# Patient Record
Sex: FEMALE | Race: WHITE | NOT HISPANIC OR LATINO | Employment: UNEMPLOYED | URBAN - NONMETROPOLITAN AREA
[De-identification: names, ages, dates, MRNs, and addresses within clinical notes are randomized per-mention and may not be internally consistent; named-entity substitution may affect disease eponyms.]

---

## 2021-09-30 PROCEDURE — 88305 TISSUE EXAM BY PATHOLOGIST: CPT | Performed by: PLASTIC SURGERY

## 2021-10-04 ENCOUNTER — LAB REQUISITION (OUTPATIENT)
Dept: LAB | Facility: HOSPITAL | Age: 61
End: 2021-10-04

## 2021-10-04 DIAGNOSIS — Z00.00 ENCOUNTER FOR GENERAL ADULT MEDICAL EXAMINATION WITHOUT ABNORMAL FINDINGS: ICD-10-CM

## 2021-10-05 LAB
CYTO UR: NORMAL
LAB AP CASE REPORT: NORMAL
LAB AP CLINICAL INFORMATION: NORMAL
PATH REPORT.FINAL DX SPEC: NORMAL
PATH REPORT.GROSS SPEC: NORMAL

## 2022-02-11 ENCOUNTER — APPOINTMENT (OUTPATIENT)
Dept: GENERAL RADIOLOGY | Facility: HOSPITAL | Age: 62
End: 2022-02-11

## 2022-02-11 ENCOUNTER — HOSPITAL ENCOUNTER (OUTPATIENT)
Facility: HOSPITAL | Age: 62
Discharge: HOME | End: 2022-02-12
Attending: EMERGENCY MEDICINE | Admitting: EMERGENCY MEDICINE

## 2022-02-11 DIAGNOSIS — R07.2 PRECORDIAL PAIN: Primary | ICD-10-CM

## 2022-02-11 DIAGNOSIS — R94.31 ST SEGMENT DEPRESSION: ICD-10-CM

## 2022-02-11 PROBLEM — R07.9 CHEST PAIN: Status: ACTIVE | Noted: 2022-02-11

## 2022-02-11 LAB
ALBUMIN SERPL-MCNC: 3.8 G/DL (ref 3.5–5.2)
ALBUMIN/GLOB SERPL: 1.1 G/DL
ALP SERPL-CCNC: 95 U/L (ref 39–117)
ALT SERPL W P-5'-P-CCNC: 10 U/L (ref 1–33)
ANION GAP SERPL CALCULATED.3IONS-SCNC: 8 MMOL/L (ref 5–15)
AST SERPL-CCNC: 14 U/L (ref 1–32)
BASOPHILS # BLD AUTO: 0.08 10*3/MM3 (ref 0–0.2)
BASOPHILS NFR BLD AUTO: 0.9 % (ref 0–1.5)
BILIRUB SERPL-MCNC: 0.4 MG/DL (ref 0–1.2)
BUN SERPL-MCNC: 23 MG/DL (ref 8–23)
BUN/CREAT SERPL: 36.5 (ref 7–25)
CALCIUM SPEC-SCNC: 9.6 MG/DL (ref 8.6–10.5)
CHLORIDE SERPL-SCNC: 103 MMOL/L (ref 98–107)
CO2 SERPL-SCNC: 31 MMOL/L (ref 22–29)
CREAT SERPL-MCNC: 0.63 MG/DL (ref 0.57–1)
D DIMER PPP FEU-MCNC: 0.37 MG/L (FEU) (ref 0–0.5)
DEPRECATED RDW RBC AUTO: 47.7 FL (ref 37–54)
EOSINOPHIL # BLD AUTO: 0.31 10*3/MM3 (ref 0–0.4)
EOSINOPHIL NFR BLD AUTO: 3.6 % (ref 0.3–6.2)
ERYTHROCYTE [DISTWIDTH] IN BLOOD BY AUTOMATED COUNT: 13.8 % (ref 12.3–15.4)
GFR SERPL CREATININE-BSD FRML MDRD: 96 ML/MIN/1.73
GLOBULIN UR ELPH-MCNC: 3.4 GM/DL
GLUCOSE SERPL-MCNC: 168 MG/DL (ref 65–99)
HCT VFR BLD AUTO: 43.6 % (ref 34–46.6)
HGB BLD-MCNC: 14.1 G/DL (ref 12–15.9)
IMM GRANULOCYTES # BLD AUTO: 0.02 10*3/MM3 (ref 0–0.05)
IMM GRANULOCYTES NFR BLD AUTO: 0.2 % (ref 0–0.5)
LYMPHOCYTES # BLD AUTO: 2.17 10*3/MM3 (ref 0.7–3.1)
LYMPHOCYTES NFR BLD AUTO: 25.2 % (ref 19.6–45.3)
MCH RBC QN AUTO: 30.2 PG (ref 26.6–33)
MCHC RBC AUTO-ENTMCNC: 32.3 G/DL (ref 31.5–35.7)
MCV RBC AUTO: 93.4 FL (ref 79–97)
MONOCYTES # BLD AUTO: 0.56 10*3/MM3 (ref 0.1–0.9)
MONOCYTES NFR BLD AUTO: 6.5 % (ref 5–12)
NEUTROPHILS NFR BLD AUTO: 5.48 10*3/MM3 (ref 1.7–7)
NEUTROPHILS NFR BLD AUTO: 63.6 % (ref 42.7–76)
NRBC BLD AUTO-RTO: 0 /100 WBC (ref 0–0.2)
NT-PROBNP SERPL-MCNC: 2483 PG/ML (ref 0–900)
PLATELET # BLD AUTO: 206 10*3/MM3 (ref 140–450)
PMV BLD AUTO: 11.6 FL (ref 6–12)
POTASSIUM SERPL-SCNC: 3.8 MMOL/L (ref 3.5–5.2)
PROT SERPL-MCNC: 7.2 G/DL (ref 6–8.5)
RBC # BLD AUTO: 4.67 10*6/MM3 (ref 3.77–5.28)
SARS-COV-2 RNA PNL SPEC NAA+PROBE: NOT DETECTED
SODIUM SERPL-SCNC: 142 MMOL/L (ref 136–145)
TROPONIN T SERPL-MCNC: <0.01 NG/ML (ref 0–0.03)
TROPONIN T SERPL-MCNC: <0.01 NG/ML (ref 0–0.03)
WBC NRBC COR # BLD: 8.62 10*3/MM3 (ref 3.4–10.8)

## 2022-02-11 PROCEDURE — 83880 ASSAY OF NATRIURETIC PEPTIDE: CPT | Performed by: EMERGENCY MEDICINE

## 2022-02-11 PROCEDURE — 93010 ELECTROCARDIOGRAM REPORT: CPT | Performed by: INTERNAL MEDICINE

## 2022-02-11 PROCEDURE — G0378 HOSPITAL OBSERVATION PER HR: HCPCS

## 2022-02-11 PROCEDURE — 85379 FIBRIN DEGRADATION QUANT: CPT | Performed by: EMERGENCY MEDICINE

## 2022-02-11 PROCEDURE — 85025 COMPLETE CBC W/AUTO DIFF WBC: CPT | Performed by: EMERGENCY MEDICINE

## 2022-02-11 PROCEDURE — 96365 THER/PROPH/DIAG IV INF INIT: CPT

## 2022-02-11 PROCEDURE — 93005 ELECTROCARDIOGRAM TRACING: CPT

## 2022-02-11 PROCEDURE — 80053 COMPREHEN METABOLIC PANEL: CPT | Performed by: EMERGENCY MEDICINE

## 2022-02-11 PROCEDURE — 71045 X-RAY EXAM CHEST 1 VIEW: CPT

## 2022-02-11 PROCEDURE — C9803 HOPD COVID-19 SPEC COLLECT: HCPCS

## 2022-02-11 PROCEDURE — 96366 THER/PROPH/DIAG IV INF ADDON: CPT

## 2022-02-11 PROCEDURE — 87635 SARS-COV-2 COVID-19 AMP PRB: CPT | Performed by: EMERGENCY MEDICINE

## 2022-02-11 PROCEDURE — 84484 ASSAY OF TROPONIN QUANT: CPT | Performed by: EMERGENCY MEDICINE

## 2022-02-11 PROCEDURE — 96372 THER/PROPH/DIAG INJ SC/IM: CPT

## 2022-02-11 PROCEDURE — 36415 COLL VENOUS BLD VENIPUNCTURE: CPT

## 2022-02-11 PROCEDURE — 99235 HOSP IP/OBS SAME DATE MOD 70: CPT | Performed by: PHYSICIAN ASSISTANT

## 2022-02-11 PROCEDURE — 93005 ELECTROCARDIOGRAM TRACING: CPT | Performed by: EMERGENCY MEDICINE

## 2022-02-11 PROCEDURE — 99285 EMERGENCY DEPT VISIT HI MDM: CPT

## 2022-02-11 PROCEDURE — 25010000002 ENOXAPARIN PER 10 MG: Performed by: PHYSICIAN ASSISTANT

## 2022-02-11 RX ORDER — HYDRALAZINE HYDROCHLORIDE 20 MG/ML
10 INJECTION INTRAMUSCULAR; INTRAVENOUS EVERY 6 HOURS PRN
Status: DISCONTINUED | OUTPATIENT
Start: 2022-02-11 | End: 2022-02-12 | Stop reason: HOSPADM

## 2022-02-11 RX ORDER — CARVEDILOL 25 MG/1
25 TABLET ORAL 2 TIMES DAILY WITH MEALS
COMMUNITY

## 2022-02-11 RX ORDER — SODIUM CHLORIDE 0.9 % (FLUSH) 0.9 %
10 SYRINGE (ML) INJECTION EVERY 12 HOURS SCHEDULED
Status: DISCONTINUED | OUTPATIENT
Start: 2022-02-11 | End: 2022-02-12 | Stop reason: HOSPADM

## 2022-02-11 RX ORDER — NITROGLYCERIN 0.4 MG/1
0.4 TABLET SUBLINGUAL
Status: DISCONTINUED | OUTPATIENT
Start: 2022-02-11 | End: 2022-02-12 | Stop reason: HOSPADM

## 2022-02-11 RX ORDER — LOSARTAN POTASSIUM 50 MG/1
50 TABLET ORAL DAILY
COMMUNITY
End: 2022-02-12 | Stop reason: HOSPADM

## 2022-02-11 RX ORDER — ACETAMINOPHEN 325 MG/1
650 TABLET ORAL EVERY 6 HOURS PRN
Status: DISCONTINUED | OUTPATIENT
Start: 2022-02-11 | End: 2022-02-12 | Stop reason: HOSPADM

## 2022-02-11 RX ORDER — SODIUM CHLORIDE 0.9 % (FLUSH) 0.9 %
10 SYRINGE (ML) INJECTION AS NEEDED
Status: DISCONTINUED | OUTPATIENT
Start: 2022-02-11 | End: 2022-02-12 | Stop reason: HOSPADM

## 2022-02-11 RX ORDER — ASPIRIN 81 MG/1
324 TABLET, CHEWABLE ORAL ONCE
Status: COMPLETED | OUTPATIENT
Start: 2022-02-11 | End: 2022-02-11

## 2022-02-11 RX ADMIN — NITROGLYCERIN 0.4 MG: 0.4 TABLET SUBLINGUAL at 13:31

## 2022-02-11 RX ADMIN — ASPIRIN 324 MG: 81 TABLET, CHEWABLE ORAL at 12:43

## 2022-02-11 RX ADMIN — SODIUM CHLORIDE 5 MG/HR: 9 INJECTION, SOLUTION INTRAVENOUS at 16:21

## 2022-02-11 RX ADMIN — ENOXAPARIN SODIUM 40 MG: 40 INJECTION SUBCUTANEOUS at 16:25

## 2022-02-11 RX ADMIN — NITROGLYCERIN 0.4 MG: 0.4 TABLET SUBLINGUAL at 12:43

## 2022-02-11 RX ADMIN — ACETAMINOPHEN 650 MG: 325 TABLET, FILM COATED ORAL at 21:41

## 2022-02-11 RX ADMIN — Medication 10 ML: at 22:57

## 2022-02-11 NOTE — H&P
[facility], [facility] History and Physical      Patient Care Team:  Provider, No Known as PCP - General    Chief complaint CP    Subjective     Patient is a 61 y.o. female without previous heart history.  She has been experiencing intermittent CP for over a week.  It sometimes radiates to her back and jaw.  She has associated SOB and worsening SOB since having COVID in November.  The pain occurs at rest and not exertion. [facility] [place] and was told that she needed an urgent heart cath.  She left AMA.  Now one week later, she is still having CP and comes to ER here.     Review of Systems  A 10-point review of systems is obtained and negative except for otherwise mentioned above.    History  Past Medical History:   Diagnosis Date   • Hyperlipidemia    • Hypertension      Past Surgical History:   Procedure Laterality Date   • FOOT SURGERY     • LAPAROSCOPIC GASTRIC BANDING     • TONSILLECTOMY       History reviewed. No pertinent family history.  Social History     Tobacco Use   • Smoking status: Never Smoker   • Smokeless tobacco: Not on file   Substance Use Topics   • Alcohol use: Never   • Drug use: Never     (Not in a hospital admission)    Allergies:  Patient has no known allergies.    Objective     Vital Signs  Temp:  [98.2 °F (36.8 °C)] 98.2 °F (36.8 °C)  Heart Rate:  [] 93  Resp:  [22] 22  BP: (167-206)/(103-135) 167/106    Physical Exam:     General Appearance:    Alert, cooperative, in no acute distress   Head:    Normocephalic, without obvious abnormality, atraumatic   Eyes:            Lids and lashes normal, conjunctivae and sclerae normal, no   icterus, PERRLA, EOMI   Throat:   Oral mucosa pink and moist   Neck:   No adenopathy, supple, trachea midline, no thyromegaly, no   carotid bruit, no JVD   Lungs:     Clear to auscultation bilaterally,respirations regular, even     and unlabored    Heart:    Regular rhythm and normal rate, normal S1 and  S2, no            murmur, no gallop, no rub, no click   Chest Wall:    No abnormalities observed   Abdomen:     Normal bowel sounds present in all four quadrants, no       masses, no organomegaly, soft non-tender, non-distended    Extremities:   No edema, no cyanosis, no clubbing   Pulses:   Pulses palpable and equal bilaterally   Skin:   No bleeding, bruising or rash   Psychiatric:   Displays appropriate mood and affect     Results Review:    I reviewed the patient's new clinical results.    Results from last 7 days   Lab Units 02/11/22  1240   WBC 10*3/mm3 8.62   HEMOGLOBIN g/dL 14.1   HEMATOCRIT % 43.6   PLATELETS 10*3/mm3 206     Results from last 7 days   Lab Units 02/11/22  1240   SODIUM mmol/L 142   POTASSIUM mmol/L 3.8   CHLORIDE mmol/L 103   CO2 mmol/L 31.0*   BUN mg/dL 23   CREATININE mg/dL 0.63   GLUCOSE mg/dL 168*   CALCIUM mg/dL 9.6     Results from last 7 days   Lab Units 02/11/22  1240   SODIUM mmol/L 142   POTASSIUM mmol/L 3.8   CHLORIDE mmol/L 103   CO2 mmol/L 31.0*   BUN mg/dL 23   CREATININE mg/dL 0.63   CALCIUM mg/dL 9.6   BILIRUBIN mg/dL 0.4   ALK PHOS U/L 95   ALT (SGPT) U/L 10   AST (SGOT) U/L 14   GLUCOSE mg/dL 168*     Results from last 7 days   Lab Units 02/11/22  1240   TROPONIN T ng/mL <0.010       Assessment/Plan       Precordial pain  HTN    Admit to telemetry.  Monitor serial cardiac enzymes.  Request records from [facility].  Adjust antihypertensives.  Home med list is not complete at this time.  May need stress testing if cardiac enzymes remain negative but will await records before deciding.  Further recommendations to follow from Dr. Chairez.    I discussed the patients findings and my recommendations with patient and primary care team.     Oxana Stanford PA-C  02/11/22  14:25 CST

## 2022-02-11 NOTE — ED PROVIDER NOTES
Emergency Medicine Provider Note    Subjective:    HISTORY OF PRESENT ILLNESS     This is a very pleasant 61 y.o. female with a past medical history of hypertension, hyperlipidemia who presents to the emergency department today with a chief complaint of chest pain.  Patient states she has had intermittent chest pain since last Friday.  It is intermittent.  Mild to severe.  Seems to be worse at night but no known exacerbating relieving factors.  A few times it is radiated to her back.  She is unable to describe the pain.  She has no numbness weakness or paresthesias.  No fevers chills or cough.  States she was admitted to the [facility] and they recommended an urgent heart cath but she left AGAINST MEDICAL ADVICE.  Since then she has continued to have this chest pain.  She does states she has what she calls resistant hypertension and has been on many medications for this.          History is obtained from the patient.       Review of Systems: All other systems are reviewed and are negative other than noted in the HPI.    Past Medical History:  Past Medical History:   Diagnosis Date   • Hyperlipidemia    • Hypertension        Allergies:  No Known Allergies    Past Surgical History:  Past Surgical History:   Procedure Laterality Date   • FOOT SURGERY     • LAPAROSCOPIC GASTRIC BANDING     • TONSILLECTOMY         Family History:  History reviewed. No pertinent family history.    Social History:  Social History     Socioeconomic History   • Marital status:    Tobacco Use   • Smoking status: Never Smoker   Substance and Sexual Activity   • Alcohol use: Never   • Drug use: Never       Home Medications:  Prior to Admission medications    Not on File         Objective:    PHYSICAL EXAM     Vitals:   Vitals:    02/11/22 1316   BP: (!) 167/106   Pulse:    Resp:    Temp:    SpO2:      GENERAL: Well appearing, in no acute distress.   HEENT: Moist mucous membranes, oropharynx clear without lesions,  exudates, thrush.   EYES: No scleral icterus, conjunctivae clear.   NECK: No cervical lymphadenopathy, no stiffness.  CARDIAC: Normal rate, regular rhythm, no murmurs, 2+ peripheral pulses in all four extremities, normal capillary refill.   PULMONARY: Normal work of breathing on room air, lungs are clear to auscultation bilaterally without wheezes, crackles, rhonchi.  ABDOMINAL: Normal bowel sounds, abdomen is soft, non-tender, non-distended, no hepatomegaly or splenomegaly.   MUSCULOSKELETAL: Normal range of motion, no lower extremity edema.  NEUROLOGIC: Alert and oriented x 3, EOM grossly intact and moves all four extremities with normal strength.  SKIN: Warm and dry without rashes.   PSYCHIATRIC: Mood and affect are normal.     PROCEDURES     Procedures    LAB AND RADIOLOGY RESULTS     Lab Results (last 24 hours)     Procedure Component Value Units Date/Time    CBC & Differential [[id number]]  (Normal) Collected: 02/11/22 1240    Specimen: Blood Updated: 02/11/22 1257    Narrative:      The following orders were created for panel order CBC & Differential.  Procedure                               Abnormality         Status                     ---------                               -----------         ------                     CBC Auto Differential[[id number]]        Normal              Final result                 Please view results for these tests on the individual orders.    Comprehensive Metabolic Panel [[id number]]  (Abnormal) Collected: 02/11/22 1240    Specimen: Blood Updated: 02/11/22 1317     Glucose 168 mg/dL      BUN 23 mg/dL      Creatinine 0.63 mg/dL      Sodium 142 mmol/L      Potassium 3.8 mmol/L      Chloride 103 mmol/L      CO2 31.0 mmol/L      Calcium 9.6 mg/dL      Total Protein 7.2 g/dL      Albumin 3.80 g/dL      ALT (SGPT) 10 U/L      AST (SGOT) 14 U/L      Alkaline Phosphatase 95 U/L      Total Bilirubin 0.4 mg/dL      eGFR Non African Amer 96 mL/min/1.73      Globulin 3.4 gm/dL      A/G  Ratio 1.1 g/dL      BUN/Creatinine Ratio 36.5     Anion Gap 8.0 mmol/L     Narrative:      GFR Normal >60  Chronic Kidney Disease <60  Kidney Failure <15      BNP [[id number]]  (Abnormal) Collected: 02/11/22 1240    Specimen: Blood Updated: 02/11/22 1314     proBNP 2,483.0 pg/mL     Narrative:      Among patients with dyspnea, NT-proBNP is highly sensitive for the detection of acute congestive heart failure. In addition NT-proBNP of <300 pg/ml effectively rules out acute congestive heart failure with 99% negative predictive value.    Results may be falsely decreased if patient taking Biotin.      D-dimer, Quantitative [[id number]]  (Normal) Collected: 02/11/22 1240    Specimen: Blood Updated: 02/11/22 1306     D-Dimer, Quantitative 0.37 mg/L (FEU)     Narrative:      Reference Range is 0-0.50 mg/L FEU. However, results <0.50 mg/L FEU tends to rule out DVT or PE. Results >0.50 mg/L FEU are not useful in predicting absence or presence of DVT or PE.      Troponin [[id number]]  (Normal) Collected: 02/11/22 1240    Specimen: Blood Updated: 02/11/22 1315     Troponin T <0.010 ng/mL     Narrative:      Troponin T Reference Range:  <= 0.03 ng/mL-   Negative for AMI  >0.03 ng/mL-     Abnormal for myocardial necrosis.  Clinicians would have to utilize clinical acumen, EKG, Troponin and serial changes to determine if it is an Acute Myocardial Infarction or myocardial injury due to an underlying chronic condition.       Results may be falsely decreased if patient taking Biotin.      CBC Auto Differential [[id number]]  (Normal) Collected: 02/11/22 1240    Specimen: Blood Updated: 02/11/22 1257     WBC 8.62 10*3/mm3      RBC 4.67 10*6/mm3      Hemoglobin 14.1 g/dL      Hematocrit 43.6 %      MCV 93.4 fL      MCH 30.2 pg      MCHC 32.3 g/dL      RDW 13.8 %      RDW-SD 47.7 fl      MPV 11.6 fL      Platelets 206 10*3/mm3      Neutrophil % 63.6 %      Lymphocyte % 25.2 %      Monocyte % 6.5 %      Eosinophil % 3.6 %      Basophil % 0.9  %      Immature Grans % 0.2 %      Neutrophils, Absolute 5.48 10*3/mm3      Lymphocytes, Absolute 2.17 10*3/mm3      Monocytes, Absolute 0.56 10*3/mm3      Eosinophils, Absolute 0.31 10*3/mm3      Basophils, Absolute 0.08 10*3/mm3      Immature Grans, Absolute 0.02 10*3/mm3      nRBC 0.0 /100 WBC     COVID-19,[facility] IN-HOUSE,Nasal Swab No Transport Media 3-4 HR TAT - Swab, Nasal Cavity [[id number]]  (Normal) Collected: 02/11/22 1243    Specimen: Swab from Nasal Cavity Updated: 02/11/22 1344     COVID19 Not Detected    Narrative:      Fact sheet for providers: https://www.fda.gov/media/600391/download     Fact sheet for patients: https://www.fda.gov/media/879305/download    Test performed by PCR.    Consider negative results in combination with clinical observations, patient history, and epidemiological information.    Troponin [[id number]] Collected: 02/11/22 1456    Specimen: Blood Updated: 02/11/22 1502          XR Chest 1 View    Result Date: 2/11/2022  Narrative: EXAMINATION: Chest 1 view 2/11/2022  HISTORY: Chest pain  FINDINGS: Upright frontal projection of the chest demonstrate moderate cardiomegaly. There is mild subsegmental atelectasis within the mid and lower lung zones bilaterally. No evidence of consolidative pneumonia or effusion. The thoracic aorta is ectatic.      Impression: 1.. Cardiomegaly. 2. Mild subsegmental atelectasis in the mid and lower lung zones bilaterally. No evidence of lobar pneumonia or effusion. This report was finalized on 02/11/2022 13:31 by Dr. Giovanni Hardy MD.      ED course:    Medications   nitroglycerin (NITROSTAT) SL tablet 0.4 mg (0.4 mg Sublingual Given 2/11/22 1331)   sodium chloride 0.9 % flush 10 mL (has no administration in time range)   sodium chloride 0.9 % flush 10 mL (has no administration in time range)   enoxaparin (LOVENOX) syringe 40 mg (has no administration in time range)   nitroglycerin (NITROSTAT) SL tablet 0.4 mg (has no administration in time range)    aspirin chewable tablet 324 mg (324 mg Oral Given 2/11/22 1243)          Amount and/or complexity of data reviewed:    • Clinical lab tests ordered and reviewed.  • Tests in the radiology section ordered and reviewed.  • Independent visualization of imaging, tracing, or specimen is remarkable for an EKG with sinus rhythm at a rate of 101, there is mild ST elevation in aVR, there are depressions and T wave inversions in the inferior as well as the lateral leads.  No prior EKG to compare with.  • Discuss the patient with another provider: Cardiology      Risk of significant complications, morbidity, and/or mortality.    •  Presenting problem: high  •  Diagnostic procedures: moderate  •  Management options: high        MEDICAL DECISION MAKING     Patient presents with chest pain. Upon arrival to the Emergency Department patient is in no acute distress although she is hypertensive.  IV access is obtained and labs are sent.  She is given aspirin and sublingual nitroglycerin.Blood pressure improves with sublingual nitroglycerin.  She is pain-free.  Lab work overall is reassuring although she does have slightly elevated BNP.  D-dimer is negative.  Troponin is negative.  Low concern for aortic dissection with no tearing or ripping pain, no pulse deficit, no widened mediastinum, negative D-dimer.  Low concern for pulmonary embolism as she is low risk Via Wells criteria and has negative D-dimer.  Low concern for pneumonia no infectious symptoms on history and no findings of this on exam or radiograph.  Low concern for myocarditis with no infectious symptoms, no fever, reassuring troponin.  Low concern for pneumothorax no physical exam or radiographic findings to suggest this.  Low concern for cardiac tamponade with no hypotension, no tachycardia, no muffled heart sounds, no elevated JVD, no narrow pulse pressure, no risk factors for pericardial effusion.  She does have a concerning history and EKG so I have discussed with  cardiology.  She has been admitted to their service for further evaluation and management.        Diagnosis:    Final diagnoses:   Precordial pain         ED Disposition:     ED Disposition     ED Disposition Condition Comment    Decision to Admit  Level of Care: Telemetry [5]   Diagnosis: Precordial pain [786.51.ICD-9-CM]   Admitting Physician: CELESTINO MICHAELS [[id number]]   Attending Physician: CELESTINO MICHAELS [[id number]]            No follow-up provider specified.       Medication List      No changes were made to your prescriptions during this visit.              Kiran Lilly MD  02/11/22 1508

## 2022-02-12 ENCOUNTER — APPOINTMENT (OUTPATIENT)
Dept: CARDIOLOGY | Facility: HOSPITAL | Age: 62
End: 2022-02-12

## 2022-02-12 VITALS
SYSTOLIC BLOOD PRESSURE: 158 MMHG | OXYGEN SATURATION: 91 % | RESPIRATION RATE: 18 BRPM | HEIGHT: 66 IN | BODY MASS INDEX: 32.63 KG/M2 | HEART RATE: 70 BPM | DIASTOLIC BLOOD PRESSURE: 100 MMHG | WEIGHT: 203.04 LBS | TEMPERATURE: 97.8 F

## 2022-02-12 PROBLEM — F41.9 ANXIETY: Status: ACTIVE | Noted: 2022-02-12

## 2022-02-12 PROBLEM — R94.31 ST SEGMENT DEPRESSION: Status: ACTIVE | Noted: 2022-02-11

## 2022-02-12 PROBLEM — I10 ESSENTIAL HYPERTENSION: Status: ACTIVE | Noted: 2022-02-12

## 2022-02-12 LAB
BH CV ECHO MEAS - AO MAX PG (FULL): 5.7 MMHG
BH CV ECHO MEAS - AO MAX PG: 8.2 MMHG
BH CV ECHO MEAS - AO MEAN PG (FULL): 4 MMHG
BH CV ECHO MEAS - AO MEAN PG: 5 MMHG
BH CV ECHO MEAS - AO ROOT AREA (BSA CORRECTED): 2
BH CV ECHO MEAS - AO ROOT AREA: 12.6 CM^2
BH CV ECHO MEAS - AO ROOT DIAM: 4 CM
BH CV ECHO MEAS - AO V2 MAX: 143 CM/SEC
BH CV ECHO MEAS - AO V2 MEAN: 110 CM/SEC
BH CV ECHO MEAS - AO V2 VTI: 36.5 CM
BH CV ECHO MEAS - AVA(I,A): 2.9 CM^2
BH CV ECHO MEAS - AVA(I,D): 2.9 CM^2
BH CV ECHO MEAS - AVA(V,A): 2.5 CM^2
BH CV ECHO MEAS - AVA(V,D): 2.5 CM^2
BH CV ECHO MEAS - BSA(HAYCOCK): 2.1 M^2
BH CV ECHO MEAS - BSA: 2 M^2
BH CV ECHO MEAS - BZI_BMI: 33.8 KILOGRAMS/M^2
BH CV ECHO MEAS - BZI_METRIC_HEIGHT: 165.1 CM
BH CV ECHO MEAS - BZI_METRIC_WEIGHT: 92.1 KG
BH CV ECHO MEAS - EDV(CUBED): 148 ML
BH CV ECHO MEAS - EDV(MOD-SP4): 94.3 ML
BH CV ECHO MEAS - EDV(TEICH): 134.8 ML
BH CV ECHO MEAS - EF(CUBED): 69.3 %
BH CV ECHO MEAS - EF(MOD-SP4): 55.6 %
BH CV ECHO MEAS - EF(TEICH): 60.4 %
BH CV ECHO MEAS - ESV(CUBED): 45.5 ML
BH CV ECHO MEAS - ESV(MOD-SP4): 41.9 ML
BH CV ECHO MEAS - ESV(TEICH): 53.3 ML
BH CV ECHO MEAS - FS: 32.5 %
BH CV ECHO MEAS - IVS/LVPW: 1.1
BH CV ECHO MEAS - IVSD: 1.3 CM
BH CV ECHO MEAS - LA DIMENSION: 4.6 CM
BH CV ECHO MEAS - LA/AO: 1.2
BH CV ECHO MEAS - LAT PEAK E' VEL: 5.1 CM/SEC
BH CV ECHO MEAS - LV DIASTOLIC VOL/BSA (35-75): 47.4 ML/M^2
BH CV ECHO MEAS - LV MASS(C)D: 258.8 GRAMS
BH CV ECHO MEAS - LV MASS(C)DI: 130 GRAMS/M^2
BH CV ECHO MEAS - LV MAX PG: 2.5 MMHG
BH CV ECHO MEAS - LV MEAN PG: 1 MMHG
BH CV ECHO MEAS - LV SYSTOLIC VOL/BSA (12-30): 21 ML/M^2
BH CV ECHO MEAS - LV V1 MAX: 79.1 CM/SEC
BH CV ECHO MEAS - LV V1 MEAN: 56.2 CM/SEC
BH CV ECHO MEAS - LV V1 VTI: 23.7 CM
BH CV ECHO MEAS - LVIDD: 5.3 CM
BH CV ECHO MEAS - LVIDS: 3.6 CM
BH CV ECHO MEAS - LVLD AP4: 8.1 CM
BH CV ECHO MEAS - LVLS AP4: 6.8 CM
BH CV ECHO MEAS - LVOT AREA (M): 4.5 CM^2
BH CV ECHO MEAS - LVOT AREA: 4.5 CM^2
BH CV ECHO MEAS - LVOT DIAM: 2.4 CM
BH CV ECHO MEAS - LVPWD: 1.2 CM
BH CV ECHO MEAS - MED PEAK E' VEL: 3.9 CM/SEC
BH CV ECHO MEAS - MR MAX PG: 69.9 MMHG
BH CV ECHO MEAS - MR MAX VEL: 418 CM/SEC
BH CV ECHO MEAS - MR MEAN PG: 51 MMHG
BH CV ECHO MEAS - MR MEAN VEL: 344 CM/SEC
BH CV ECHO MEAS - MR VTI: 175 CM
BH CV ECHO MEAS - MV A MAX VEL: 99.8 CM/SEC
BH CV ECHO MEAS - MV DEC SLOPE: 368 CM/SEC^2
BH CV ECHO MEAS - MV DEC TIME: 0.27 SEC
BH CV ECHO MEAS - MV E MAX VEL: 98.4 CM/SEC
BH CV ECHO MEAS - MV E/A: 0.99
BH CV ECHO MEAS - RAP SYSTOLE: 10 MMHG
BH CV ECHO MEAS - RVSP: 23.5 MMHG
BH CV ECHO MEAS - SI(AO): 230.4 ML/M^2
BH CV ECHO MEAS - SI(CUBED): 51.5 ML/M^2
BH CV ECHO MEAS - SI(LVOT): 53.9 ML/M^2
BH CV ECHO MEAS - SI(MOD-SP4): 26.3 ML/M^2
BH CV ECHO MEAS - SI(TEICH): 40.9 ML/M^2
BH CV ECHO MEAS - SV(AO): 458.7 ML
BH CV ECHO MEAS - SV(CUBED): 102.5 ML
BH CV ECHO MEAS - SV(LVOT): 107.2 ML
BH CV ECHO MEAS - SV(MOD-SP4): 52.4 ML
BH CV ECHO MEAS - SV(TEICH): 81.4 ML
BH CV ECHO MEAS - TR MAX VEL: 184 CM/SEC
BH CV ECHO MEASUREMENTS AVERAGE E/E' RATIO: 21.87
LEFT ATRIUM VOLUME INDEX: 41.1 ML/M2
LEFT ATRIUM VOLUME: 81.8 CM3
MAXIMAL PREDICTED HEART RATE: 158 BPM
STRESS TARGET HR: 134 BPM
TROPONIN T SERPL-MCNC: <0.01 NG/ML (ref 0–0.03)

## 2022-02-12 PROCEDURE — C1769 GUIDE WIRE: HCPCS | Performed by: EMERGENCY MEDICINE

## 2022-02-12 PROCEDURE — 93005 ELECTROCARDIOGRAM TRACING: CPT | Performed by: NURSE PRACTITIONER

## 2022-02-12 PROCEDURE — 94799 UNLISTED PULMONARY SVC/PX: CPT

## 2022-02-12 PROCEDURE — C1894 INTRO/SHEATH, NON-LASER: HCPCS | Performed by: EMERGENCY MEDICINE

## 2022-02-12 PROCEDURE — 0 IOPAMIDOL PER 1 ML: Performed by: EMERGENCY MEDICINE

## 2022-02-12 PROCEDURE — 93458 L HRT ARTERY/VENTRICLE ANGIO: CPT | Performed by: EMERGENCY MEDICINE

## 2022-02-12 PROCEDURE — G0378 HOSPITAL OBSERVATION PER HR: HCPCS

## 2022-02-12 PROCEDURE — 99152 MOD SED SAME PHYS/QHP 5/>YRS: CPT | Performed by: EMERGENCY MEDICINE

## 2022-02-12 PROCEDURE — 25010000002 DIPHENHYDRAMINE PER 50 MG: Performed by: EMERGENCY MEDICINE

## 2022-02-12 PROCEDURE — 25010000002 HEPARIN (PORCINE) PER 1000 UNITS: Performed by: EMERGENCY MEDICINE

## 2022-02-12 PROCEDURE — 93306 TTE W/DOPPLER COMPLETE: CPT | Performed by: EMERGENCY MEDICINE

## 2022-02-12 PROCEDURE — 25010000002 FENTANYL CITRATE (PF) 100 MCG/2ML SOLUTION: Performed by: EMERGENCY MEDICINE

## 2022-02-12 PROCEDURE — 84484 ASSAY OF TROPONIN QUANT: CPT | Performed by: NURSE PRACTITIONER

## 2022-02-12 PROCEDURE — 25010000002 HYDRALAZINE PER 20 MG: Performed by: EMERGENCY MEDICINE

## 2022-02-12 PROCEDURE — 25010000002 HEPARIN (PORCINE): Performed by: EMERGENCY MEDICINE

## 2022-02-12 PROCEDURE — 93010 ELECTROCARDIOGRAM REPORT: CPT | Performed by: INTERNAL MEDICINE

## 2022-02-12 PROCEDURE — 96375 TX/PRO/DX INJ NEW DRUG ADDON: CPT

## 2022-02-12 PROCEDURE — 25010000002 MIDAZOLAM HCL (PF) 5 MG/5ML SOLUTION: Performed by: EMERGENCY MEDICINE

## 2022-02-12 PROCEDURE — 93306 TTE W/DOPPLER COMPLETE: CPT

## 2022-02-12 RX ORDER — ALPRAZOLAM 0.25 MG/1
0.25 TABLET ORAL 3 TIMES DAILY PRN
Status: DISCONTINUED | OUTPATIENT
Start: 2022-02-12 | End: 2022-02-12 | Stop reason: HOSPADM

## 2022-02-12 RX ORDER — ONDANSETRON 2 MG/ML
4 INJECTION INTRAMUSCULAR; INTRAVENOUS EVERY 6 HOURS PRN
Status: CANCELLED | OUTPATIENT
Start: 2022-02-12

## 2022-02-12 RX ORDER — LOSARTAN POTASSIUM 50 MG/1
100 TABLET ORAL
Status: DISCONTINUED | OUTPATIENT
Start: 2022-02-13 | End: 2022-02-12 | Stop reason: HOSPADM

## 2022-02-12 RX ORDER — LOSARTAN POTASSIUM 100 MG/1
100 TABLET ORAL
Qty: 30 TABLET | Refills: 11 | Status: SHIPPED | OUTPATIENT
Start: 2022-02-13

## 2022-02-12 RX ORDER — SODIUM CHLORIDE 0.9 % (FLUSH) 0.9 %
3-10 SYRINGE (ML) INJECTION AS NEEDED
Status: CANCELLED | OUTPATIENT
Start: 2022-02-12

## 2022-02-12 RX ORDER — MIDAZOLAM HYDROCHLORIDE 1 MG/ML
INJECTION, SOLUTION INTRAMUSCULAR; INTRAVENOUS AS NEEDED
Status: DISCONTINUED | OUTPATIENT
Start: 2022-02-12 | End: 2022-02-12 | Stop reason: HOSPADM

## 2022-02-12 RX ORDER — CLONIDINE HYDROCHLORIDE 0.1 MG/1
0.1 TABLET ORAL DAILY PRN
Qty: 30 TABLET | Refills: 1 | Status: SHIPPED | OUTPATIENT
Start: 2022-02-12 | End: 2022-05-06

## 2022-02-12 RX ORDER — LOSARTAN POTASSIUM 50 MG/1
50 TABLET ORAL ONCE
Status: COMPLETED | OUTPATIENT
Start: 2022-02-12 | End: 2022-02-12

## 2022-02-12 RX ORDER — CARVEDILOL 25 MG/1
25 TABLET ORAL 2 TIMES DAILY WITH MEALS
Status: DISCONTINUED | OUTPATIENT
Start: 2022-02-12 | End: 2022-02-12 | Stop reason: HOSPADM

## 2022-02-12 RX ORDER — LOSARTAN POTASSIUM 50 MG/1
50 TABLET ORAL
Status: DISCONTINUED | OUTPATIENT
Start: 2022-02-12 | End: 2022-02-12

## 2022-02-12 RX ORDER — DIPHENHYDRAMINE HYDROCHLORIDE 50 MG/ML
INJECTION INTRAMUSCULAR; INTRAVENOUS AS NEEDED
Status: DISCONTINUED | OUTPATIENT
Start: 2022-02-12 | End: 2022-02-12 | Stop reason: HOSPADM

## 2022-02-12 RX ORDER — ONDANSETRON 4 MG/1
4 TABLET, FILM COATED ORAL EVERY 6 HOURS PRN
Status: DISCONTINUED | OUTPATIENT
Start: 2022-02-12 | End: 2022-02-12 | Stop reason: HOSPADM

## 2022-02-12 RX ORDER — SODIUM CHLORIDE 9 MG/ML
100 INJECTION, SOLUTION INTRAVENOUS CONTINUOUS
Status: DISCONTINUED | OUTPATIENT
Start: 2022-02-12 | End: 2022-02-12 | Stop reason: HOSPADM

## 2022-02-12 RX ORDER — SODIUM CHLORIDE 0.9 % (FLUSH) 0.9 %
3 SYRINGE (ML) INJECTION EVERY 12 HOURS SCHEDULED
Status: CANCELLED | OUTPATIENT
Start: 2022-02-12

## 2022-02-12 RX ORDER — ASPIRIN 81 MG/1
324 TABLET, CHEWABLE ORAL ONCE
Status: CANCELLED | OUTPATIENT
Start: 2022-02-12 | End: 2022-02-12

## 2022-02-12 RX ORDER — NITROGLYCERIN 0.4 MG/1
0.4 TABLET SUBLINGUAL
Status: CANCELLED | OUTPATIENT
Start: 2022-02-12

## 2022-02-12 RX ORDER — ASPIRIN 81 MG/1
81 TABLET ORAL DAILY
Status: CANCELLED | OUTPATIENT
Start: 2022-02-13

## 2022-02-12 RX ORDER — CLONIDINE HYDROCHLORIDE 0.1 MG/1
0.1 TABLET ORAL ONCE
Status: COMPLETED | OUTPATIENT
Start: 2022-02-12 | End: 2022-02-12

## 2022-02-12 RX ORDER — ACETAMINOPHEN 325 MG/1
650 TABLET ORAL EVERY 4 HOURS PRN
Status: DISCONTINUED | OUTPATIENT
Start: 2022-02-12 | End: 2022-02-12 | Stop reason: HOSPADM

## 2022-02-12 RX ORDER — ONDANSETRON 2 MG/ML
4 INJECTION INTRAMUSCULAR; INTRAVENOUS EVERY 6 HOURS PRN
Status: DISCONTINUED | OUTPATIENT
Start: 2022-02-12 | End: 2022-02-12 | Stop reason: HOSPADM

## 2022-02-12 RX ORDER — FENTANYL CITRATE 50 UG/ML
INJECTION, SOLUTION INTRAMUSCULAR; INTRAVENOUS AS NEEDED
Status: DISCONTINUED | OUTPATIENT
Start: 2022-02-12 | End: 2022-02-12 | Stop reason: HOSPADM

## 2022-02-12 RX ADMIN — CLONIDINE HYDROCHLORIDE 0.1 MG: 0.1 TABLET ORAL at 10:11

## 2022-02-12 RX ADMIN — LOSARTAN POTASSIUM 50 MG: 50 TABLET, FILM COATED ORAL at 08:32

## 2022-02-12 RX ADMIN — LOSARTAN POTASSIUM 50 MG: 50 TABLET, FILM COATED ORAL at 15:51

## 2022-02-12 RX ADMIN — CARVEDILOL 25 MG: 25 TABLET, FILM COATED ORAL at 08:32

## 2022-02-12 RX ADMIN — CARVEDILOL 25 MG: 25 TABLET, FILM COATED ORAL at 17:16

## 2022-02-12 RX ADMIN — SODIUM CHLORIDE 100 ML/HR: 9 INJECTION, SOLUTION INTRAVENOUS at 13:08

## 2022-02-12 RX ADMIN — HYDRALAZINE HYDROCHLORIDE 10 MG: 20 INJECTION INTRAMUSCULAR; INTRAVENOUS at 08:23

## 2022-02-12 NOTE — DISCHARGE SUMMARY
[facility] DISCHARGE    Date of Discharge:  2/12/2022    Discharge Diagnosis:   ST segment depression    Precordial pain    Chest pain    Essential hypertension    Anxiety      Presenting Problem/History of Present Illness  Precordial pain [R07.2]  Chest pain [R07.9]    Hospital Course  Patient is a 61 y.o. female presented with a 1-2 week history of back pain and chest pain. She was found to be hypertensive and admitted to the unit and placed on a cardene drip. She was found to have normal troponins with ST depression. She was taken to the cath lab today which showed normal coronaries. Hypertensive Urgency with elevated LVEDP. She had an echo which showed normal LVEF, dilated LV with severe LVH, La mildly dilated, mild MR. She was also noted with incidental gallstones. Overall she is ready for discharge today. She was resumed on her home medications this morning and will be given an extra Losartan 50 with plans to increase home dose to 100 tomorrow morning.     Procedures Performed  Procedure(s):  Left Heart Cath       Consults:   Consults     No orders found for last 30 day(s).          Pertinent Test Results:   Lab Results (last 24 hours)     Procedure Component Value Units Date/Time    Troponin [[id number]]  (Normal) Collected: 02/12/22 1016    Specimen: Blood Updated: 02/12/22 1048     Troponin T <0.010 ng/mL     Narrative:      Troponin T Reference Range:  <= 0.03 ng/mL-   Negative for AMI  >0.03 ng/mL-     Abnormal for myocardial necrosis.  Clinicians would have to utilize clinical acumen, EKG, Troponin and serial changes to determine if it is an Acute Myocardial Infarction or myocardial injury due to an underlying chronic condition.       Results may be falsely decreased if patient taking Biotin.          Results for orders placed during the hospital encounter of 02/11/22    Cardiac Catheterization/Vascular Study    Narrative  Date of procedure:  2/12/2022    Findings:    Hemodynamics:  Aortic: 158/107 mmHg  LV: 147/19 mmHg  LVEDP: 31 mmHg    Left ventriculography:  1.  Contractility of the left ventricle was normal.  Estimated ejection fraction is 60%  2.  There is no significant gradient across the aortic valve on pullback        Selective coronary angiography:  Left main: Left main is a very large caliber vessel that bifurcates into the LAD and left circumflex coronary arteries, no angiographic evidence of stenosis, VAIBHAV-3 flow  LAD: The LAD is a large-caliber vessel that wraps around to the apex, no angiographic evidence of stenosis, VAIBHAV-3 flow  Diagonals: The first diagonal is moderate caliber with no significant disease, the second diagonal is moderate caliber with no significant disease, the third diagonal small caliber  Left circumflex: Left circumflex is a large-caliber vessel with no angiographic evidence of stenosis, VAIBHAV-3 flow  Obtuse marginals: The first obtuse marginal is a large-caliber vessel that has no significant disease  Right coronary artery: The RCA is a very large, dominant vessel with no angiographic evidence of stenosis, VAIBHAV-3 flow  PDA/VANIA: Both of these are large caliber vessels with no significant disease  Please note: All of patient's coronary arteries are very tortuous and consistent with longstanding history of hypertensive disease.    Impression:  1.  No significant epicardial coronary artery disease as described above  2.  Hypertensive emergency-likely etiology for her significant chest pain and EKG changes  3.  Elevated left ventricular end-diastolic pressure  4.  Anxiety    Plan:  1.  TR band off in 2 hours  2.  Maximize patient's antihypertension regimen  3.  Obtain transthoracic echocardiogram now  4.  Okay for discharge home later today if we can get blood pressure under control and transthoracic echocardiogram does not show any significant findings  5.  Close follow-up with cardiology as an outpatient        Raymundo  KARAN Chairez DO  Interventional cardiology  [facility]  February 12, 2022    Results for orders placed during the hospital encounter of 02/11/22    Adult Transthoracic Echo Complete W/ Cont if Necessary Per Protocol    Interpretation Summary  · Left ventricular ejection fraction appears to be 56 - 60%. Left ventricular systolic function is normal.  · The left ventricular cavity is moderately dilated. Left ventricular wall thickness is consistent with severe concentric hypertrophy  · Left ventricular diastolic function is consistent with (grade II w/high LAP) pseudonormalization.  · Normal right ventricular cavity size and systolic function noted.  · The left atrial cavity is mildly dilated.  · Mild mitral valve regurgitation is present.  · No evidence of pulmonary hypertension is present.  · Incidental finding of gallstones in the gallbladder. Consider dedicated right upper quadrant ultrasound to further assess for significant gallbladder disease.    Condition on Discharge: Stable     Physical Exam at Discharge    Vital Signs  Temp:  [97.3 °F (36.3 °C)-98.2 °F (36.8 °C)] 97.8 °F (36.6 °C)  Heart Rate:  [] 70  Resp:  [18-27] 18  BP: (119-198)/() 143/85    Physical Exam:  Constitutional:       Appearance: Healthy appearance. Well-developed and not in distress.   Eyes:      Pupils: Pupils are equal, round, and reactive to light.   HENT:      Head: Normocephalic and atraumatic.   Neck:      Vascular: No carotid bruit or JVD.   Pulmonary:      Effort: Pulmonary effort is normal.      Breath sounds: Normal breath sounds.   Cardiovascular:      Normal rate. Regular rhythm.   Pulses:     Intact distal pulses.   Edema:     Peripheral edema absent.   Abdominal:      General: Bowel sounds are normal.      Palpations: Abdomen is soft.   Musculoskeletal: Normal range of motion.      Cervical back: Normal range of motion and neck supple. Skin:     General: Skin is warm and dry.   Neurological:       Mental Status: Alert and oriented to person, place, and time.      Deep Tendon Reflexes: Reflexes are normal and symmetric.   Psychiatric:         Behavior: Behavior normal.         Thought Content: Thought content normal.         Judgment: Judgment normal.         Discharge Disposition  Home or Self Care    Discharge Medications     Discharge Medications      Changes to Medications      Instructions Start Date   losartan 100 MG tablet  Commonly known as: COZAAR  What changed:   · medication strength  · how much to take  · when to take this   100 mg, Oral, Every 24 Hours Scheduled   Start Date: February 13, 2022        Continue These Medications      Instructions Start Date   carvedilol 25 MG tablet  Commonly known as: COREG   25 mg, Oral, 2 Times Daily With Meals             Discharge Diet: Low Salt. Heart Healthy     Activity at Discharge:  Do not lift greater than 5 pounds. Do not drive for 48 hours. Do not bend and twist at waist. Shower only for one week, do not submerge in water. Discussed signs and symptoms of infection including drainage, redness, and pain. Discussed routine groin care.     Plan:  Patient will be discharged home today. Increase Losartan to 100mg. Discussed echo with her and importance of blood pressure management. Discussed incidental finding of gallstones and recommend follow up with PCP for evaluation of gallbladder. Discussed groin care.       Electronically signed by EBER Gutierres, 02/12/22, 3:35 PM CST.     Time spent on discharge: 33 minutes

## 2022-02-12 NOTE — PLAN OF CARE
"Goal Outcome Evaluation:  Plan of Care Reviewed With: patient        Progress: improving  Outcome Summary: Sinus rhythm of 82 with PVC on tele.  BP was elevated upon arrival to  but returned to normal limits without intervention.  Pt stated her elevated BP was due to \"getting yelled at by that ICU nurse.\" Stated she didn't want to \"be held prisoner\" by being hooked up to the monitors in the ICU.  C/o ongoing/unchanged chest pain that radiates to back, rated at a 7, but refused Tylenol offered.  Pt was ambulating in the hallway at 0530 to \"walk it off.\"  Pt expressed that she wishes \"they would just go ahead and do the (heart) cath instead of doing the stress test\".  Pt has left her heart monitor in place.  Will continue to monitor.  "

## 2022-02-12 NOTE — NURSING NOTE
Patient removed TR band and saline locked IV, took tele off and got dressed to discharge. Explained discharge is dependent on blood pressure stabilizing and cardiology approval.

## 2022-02-12 NOTE — OP NOTE
[facility]ART GROUP  Date of procedure: 2/12/2022     Procedures performed:     1.  Access to the right radial artery via modified Seldinger technique  2.  Left heart catheterization with retrograde crosscut aortic valve  3.  LV ventriculography  4.  Selective bilateral coronary angiography  5.  Conscious sedation with continuous hemodynamic monitoring for 15 minutes  6.  Patent hemostasis achieved in the right radial artery using a TR band applied to the right wrist once the sheath was pulled        Risk, Benefits, and Alternatives discussed with the patient and/or family.  Plan is for moderate sedation, and the patient agrees to proceed with the procedure.  An immediate assessment was done prior to the administration of moderate sedation        Indication: Unstable angina with dynamic EKG changes  Premedication: Versed, Fentanyl  Contrast: Isovue 84cc  Radiation: Flouro time= 2.3 minutes. Air Kerma= 252 mGy  Catheters: 5F TIG, pigtail      Procedural details:  The patient was brought to the cath lab and prepped and draped in the usual fashion.  Access was obtained in the right radial artery via modified Seldinger technique.  A 6 Dutch sheath was placed into the artery and flushed.  A TIG catheter was inserted and used to engage the left main.  Selective left coronary angiography was performed in multiple views.  This catheter was then used to engage the right and selective right coronary angiography was performed in multiple views.  This catheter was then exchanged for a pigtail catheter which was used to cross the aortic valve into the left ventricle where pressures were recorded.  LV ventriculography was then performed.  The catheter was withdrawn back cross the aortic valve and again pressures were recorded.  Everything was then withdrawn to the sheath and the sheath was flushed.  Patent hemostasis was achieved in the right radial artery access site using a TR band applied to the right wrist  once the sheath was pulled.  Patient tolerated the procedure well without any complications.  She left the Cath Lab in stable condition.    I supervised the administration of conscious sedation by nursing staff throughout the case.  First dose was given at 1140 and the end of my face-to-face encounter was at 1156, accounting for a total of 15 minutes of supervision.  During the case, continuous pulse oximetry, heart rate, blood pressure, and patient status were monitored.     Findings:    Hemodynamics:    Aortic: 158/107 mmHg  LV: 147/19 mmHg  LVEDP: 31 mmHg    Left ventriculography:  1.  Contractility of the left ventricle was normal.  Estimated ejection fraction is 60%  2.  There is no significant gradient across the aortic valve on pullback        Selective coronary angiography:   Left main: Left main is a very large caliber vessel that bifurcates into the LAD and left circumflex coronary arteries, no angiographic evidence of stenosis, VAIBHAV-3 flow  LAD: The LAD is a large-caliber vessel that wraps around to the apex, no angiographic evidence of stenosis, VAIBHAV-3 flow  Diagonals: The first diagonal is moderate caliber with no significant disease, the second diagonal is moderate caliber with no significant disease, the third diagonal small caliber  Left circumflex: Left circumflex is a large-caliber vessel with no angiographic evidence of stenosis, VAIBHAV-3 flow  Obtuse marginals: The first obtuse marginal is a large-caliber vessel that has no significant disease  Right coronary artery: The RCA is a very large, dominant vessel with no angiographic evidence of stenosis, VAIBHAV-3 flow  PDA/VANIA: Both of these are large caliber vessels with no significant disease  Please note: All of patient's coronary arteries are very tortuous and consistent with longstanding history of hypertensive disease.            Estimated Blood Loss: Minimal    Specimens: None    Complications: None     Impression:  1.  No significant epicardial  coronary artery disease as described above  2.  Hypertensive emergency-likely etiology for her significant chest pain and EKG changes  3.  Elevated left ventricular end-diastolic pressure  4.  Anxiety           Plan:   1.  TR band off in 2 hours  2.  Maximize patient's antihypertension regimen  3.  Obtain transthoracic echocardiogram now  4.  Okay for discharge home later today if we can get blood pressure under control and transthoracic echocardiogram does not show any significant findings  5.  Close follow-up with cardiology as an outpatient        Raymundo Chairez,   Interventional cardiology  [facility]  February 12, 2022

## 2022-02-12 NOTE — NURSING NOTE
"Pt arrived to ICU shortly after shift change and was agreeable with care. Closer to 10:30pm, pt started refusing to have her blood pressure taken, saying that she would let it be taken every 2 hours, but not any more than that. The cardene had been shut off since 2015, but pt was educated on the importance of having her vitals taken more often to ensure her BP was WNL. She blatently refused. Stated that \"she was tired and just needed sleep and that would help\". Around 0300, pt took off all telemetry leads, BP cuff and O2 probe. Joel, primary RN went in to the room and pt was complaining of chest pain that radiated to her back. Joel educated her on the importance of us being able to monitor her heart rhythm and BP to make sure there were not changes. Joel offered to call the physician as well. Pt adamantly refused all interventions. This RN attempted to talk to pt as well and educate her on the importance of close monitoring and her treatment. 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 This is torture to be hooked to everything all of the time. I wish you all would just let me go to the floor\". We discussed how she would still have to be on telemetry if she does indeed go to the floor. She stated \"at least it would fit in my pocket\". Dr. Chairez had already given an order that we could move to the floor if pt remained off cardene. House supervisor was notified and pt was moved to room 404.   "

## 2022-02-12 NOTE — PROGRESS NOTES
"[facility]ART GROUP -  Progress Note     LOS: 0 days   Patient Care Team:  Provider, No Known as PCP - General    Chief Complaint: Chest Pain     Subjective     Interval History:   Patient was moved out to the floor last night after weaning off Cardene. Patient notes she had a bad night and did not sleep well. She notes \"I did not leave AMA, [facility].\" But she does note that she \"didn't want to have it done there because I didn't want to wait around for days.\" She notes she tried to get an outpatient cath arranged this week but was not able to do this in a timely manner. She then looked up hospitals and decided to come to our hospital. She notes she had a CT of her chest and was told she had pulmonary hypertension, no aneurysm and \"ischemic plaque\"- but she cannot tell me of where. She notes she has had this pain off and on since last Friday when she went to [facility]. She notes she initially though it was a \"stomach ulcer\" but none of her GI medications helped. She notes it feels like a \"catch in her back.\" She notes it almost felt like if I loosened my bra it would help but it did not.       Review of Systems:     Review of Systems   Respiratory: Positive for chest tightness.    Cardiovascular: Positive for chest pain.   Psychiatric/Behavioral: Positive for agitation. The patient is nervous/anxious.      Objective     Vital Sign Min/Max for last 24 hours  Temp  Min: 97.3 °F (36.3 °C)  Max: 98.2 °F (36.8 °C)   BP  Min: 119/47  Max: 206/135   Pulse  Min: 66  Max: 106   Resp  Min: 18  Max: 27   SpO2  Min: 84 %  Max: 96 %   No data recorded   Weight  Min: 92.1 kg (203 lb)  Max: 92.1 kg (203 lb)         02/11/22 1958   Weight: 92.1 kg (203 lb)       Telemetry: NSR       Physical Exam:    Constitutional:       Appearance: Healthy appearance. Not in distress.   Eyes:      Pupils: Pupils are equal, round, and reactive to light.   HENT:      Head: Normocephalic and atraumatic.    "   Nose: Nose normal.    Mouth/Throat:      Dentition: Normal.   Pulmonary:      Effort: Pulmonary effort is normal.      Breath sounds: Normal breath sounds.   Chest:      Chest wall: Not tender to palpatation.   Cardiovascular:      PMI at left midclavicular line. Normal rate. Regular rhythm.      Murmurs: There is no murmur.   Pulses:     Intact distal pulses.   Edema:     Peripheral edema absent.   Abdominal:      General: Bowel sounds are normal.      Palpations: Abdomen is soft.   Musculoskeletal: Normal range of motion.      Cervical back: Normal range of motion. Skin:     General: Skin is warm and dry.   Neurological:      Mental Status: Alert, oriented to person, place, and time and oriented to person, place and time.   Psychiatric:         Attention and Perception: Attention normal.         Mood and Affect: Mood is anxious.       Results Review:   Lab Results (last 72 hours)     Procedure Component Value Units Date/Time    Troponin [[id number]]  (Normal) Collected: 02/11/22 1456    Specimen: Blood Updated: 02/11/22 1522     Troponin T <0.010 ng/mL     Narrative:      Troponin T Reference Range:  <= 0.03 ng/mL-   Negative for AMI  >0.03 ng/mL-     Abnormal for myocardial necrosis.  Clinicians would have to utilize clinical acumen, EKG, Troponin and serial changes to determine if it is an Acute Myocardial Infarction or myocardial injury due to an underlying chronic condition.       Results may be falsely decreased if patient taking Biotin.      COVID-19,[facility] [employer] IN-HOUSE,Nasal Swab No Transport Media 3-4 HR TAT - Swab, Nasal Cavity [[id number]]  (Normal) Collected: 02/11/22 1243    Specimen: Swab from Nasal Cavity Updated: 02/11/22 1344     COVID19 Not Detected    Narrative:      Fact sheet for providers: https://www.fda.gov/media/775015/download     Fact sheet for patients: https://www.fda.gov/media/211542/download    Test performed by PCR.    Consider negative results in combination with clinical  observations, patient history, and epidemiological information.    Comprehensive Metabolic Panel [[id number]]  (Abnormal) Collected: 02/11/22 1240    Specimen: Blood Updated: 02/11/22 1317     Glucose 168 mg/dL      BUN 23 mg/dL      Creatinine 0.63 mg/dL      Sodium 142 mmol/L      Potassium 3.8 mmol/L      Chloride 103 mmol/L      CO2 31.0 mmol/L      Calcium 9.6 mg/dL      Total Protein 7.2 g/dL      Albumin 3.80 g/dL      ALT (SGPT) 10 U/L      AST (SGOT) 14 U/L      Alkaline Phosphatase 95 U/L      Total Bilirubin 0.4 mg/dL      eGFR Non African Amer 96 mL/min/1.73      Globulin 3.4 gm/dL      A/G Ratio 1.1 g/dL      BUN/Creatinine Ratio 36.5     Anion Gap 8.0 mmol/L     Narrative:      GFR Normal >60  Chronic Kidney Disease <60  Kidney Failure <15      Troponin [[id number]]  (Normal) Collected: 02/11/22 1240    Specimen: Blood Updated: 02/11/22 1315     Troponin T <0.010 ng/mL     Narrative:      Troponin T Reference Range:  <= 0.03 ng/mL-   Negative for AMI  >0.03 ng/mL-     Abnormal for myocardial necrosis.  Clinicians would have to utilize clinical acumen, EKG, Troponin and serial changes to determine if it is an Acute Myocardial Infarction or myocardial injury due to an underlying chronic condition.       Results may be falsely decreased if patient taking Biotin.      BNP [[id number]]  (Abnormal) Collected: 02/11/22 1240    Specimen: Blood Updated: 02/11/22 1314     proBNP 2,483.0 pg/mL     Narrative:      Among patients with dyspnea, NT-proBNP is highly sensitive for the detection of acute congestive heart failure. In addition NT-proBNP of <300 pg/ml effectively rules out acute congestive heart failure with 99% negative predictive value.    Results may be falsely decreased if patient taking Biotin.      D-dimer, Quantitative [[id number]]  (Normal) Collected: 02/11/22 1240    Specimen: Blood Updated: 02/11/22 1306     D-Dimer, Quantitative 0.37 mg/L (FEU)     Narrative:      Reference Range is 0-0.50 mg/L FEU.  However, results <0.50 mg/L FEU tends to rule out DVT or PE. Results >0.50 mg/L FEU are not useful in predicting absence or presence of DVT or PE.      CBC & Differential [[id number]]  (Normal) Collected: 02/11/22 1240    Specimen: Blood Updated: 02/11/22 1257    Narrative:      The following orders were created for panel order CBC & Differential.  Procedure                               Abnormality         Status                     ---------                               -----------         ------                     CBC Auto Differential[[id number]]        Normal              Final result                 Please view results for these tests on the individual orders.    CBC Auto Differential [[id number]]  (Normal) Collected: 02/11/22 1240    Specimen: Blood Updated: 02/11/22 1257     WBC 8.62 10*3/mm3      RBC 4.67 10*6/mm3      Hemoglobin 14.1 g/dL      Hematocrit 43.6 %      MCV 93.4 fL      MCH 30.2 pg      MCHC 32.3 g/dL      RDW 13.8 %      RDW-SD 47.7 fl      MPV 11.6 fL      Platelets 206 10*3/mm3      Neutrophil % 63.6 %      Lymphocyte % 25.2 %      Monocyte % 6.5 %      Eosinophil % 3.6 %      Basophil % 0.9 %      Immature Grans % 0.2 %      Neutrophils, Absolute 5.48 10*3/mm3      Lymphocytes, Absolute 2.17 10*3/mm3      Monocytes, Absolute 0.56 10*3/mm3      Eosinophils, Absolute 0.31 10*3/mm3      Basophils, Absolute 0.08 10*3/mm3      Immature Grans, Absolute 0.02 10*3/mm3      nRBC 0.0 /100 WBC         Medication Review: yes  Current Facility-Administered Medications   Medication Dose Route Frequency Provider Last Rate Last Admin   • acetaminophen (TYLENOL) tablet 650 mg  650 mg Oral Q6H PRN Raymundo Chairez DO   650 mg at 02/11/22 2141   • ALPRAZolam (XANAX) tablet 0.25 mg  0.25 mg Oral TID PRN Charles Worthy APRN       • carvedilol (COREG) tablet 25 mg  25 mg Oral BID With Meals Charles Worthy APRN   25 mg at 02/12/22 0832   • enoxaparin (LOVENOX) syringe 40 mg  40 mg Subcutaneous Q24H  Oxana Stanford PA-C   40 mg at 02/11/22 1625   • hydrALAZINE (APRESOLINE) injection 10 mg  10 mg Intravenous Q6H PRN Raymundo Chairez DO   10 mg at 02/12/22 0823   • losartan (COZAAR) tablet 50 mg  50 mg Oral Q24H Charles Worthy APRN   50 mg at 02/12/22 0832   • niCARdipine (CARDENE) 25 mg in 250 mL NS infusion  5-15 mg/hr Intravenous Titrated Kiran Lilly MD   Held at 02/11/22 2015   • nitroglycerin (NITROSTAT) SL tablet 0.4 mg  0.4 mg Sublingual Q5 Min PRN Oxana Stanford PA-C   0.4 mg at 02/11/22 1331   • nitroglycerin (NITROSTAT) SL tablet 0.4 mg  0.4 mg Sublingual Q5 Min PRN Oxana Stanford PA-C       • sodium chloride 0.9 % flush 10 mL  10 mL Intravenous Q12H Oxana Stanford PA-C   10 mL at 02/11/22 2257   • sodium chloride 0.9 % flush 10 mL  10 mL Intravenous PRN Oxana Stanford PA-C             Assessment/Plan       Precordial pain    Chest pain    Essential hypertension    Anxiety    Plan:  Chest Pain- Patient notes she had a bad night and did not sleep. She notes she had a CT at [facility] and was told she did not have an aneurysm but there was concern over pulmonary hypertension. Pain is well controlled at this time. Will check troponin and EKG this morning as patient reports she hurt most of the night. Check Echo. Patient does have ST depression on EKG, No previous EKG for compare.     Hypertension- Blood Pressure was controlled on Cardene which was weaned off. BP now elevated again this am. Will give PRN IV Hydralazine and start home doses of Coreg and Losartan. She notes her BP has been running high for some time. I suspect some of this is driven by anxiety.     Anxiety- I offered patient PRN medication but she notes she does not want at this time. She also notes she was recently started outpatient on something.     Lengthy discussion had with patient about plan of care. I am not able to locate records from [facility] at this  "time. She is currently pain free. Will repeat troponin and EKG this am. If no acute changes I did discuss patient may not have emergent heart cath today- she is very upset by this and notes this is why she [facility] [place] because she did not want to \"sit around and wait\". She notes \"I want a heart cath like my dad had where you come in and have it and go home.\" This is why she traveled from [place] to [facility] hospital because \"I thought you would do that here.\"       Electronically signed by EBER Gutierres, 02/12/22, 8:31 AM CST.   "

## 2022-02-13 NOTE — NURSING NOTE
Patient insists blood pressure is baseline for her and informs staff she is discharging. Charge nurse contacted cardiology NP and was instructed to allow patient to be discharged. When Rn returned to patient room, patient had removed peripheral IV and laid it on the window sill. DC instructions given and patient was wheeled out in stable condition to private car in company of her Aunt.

## 2022-02-14 LAB
QT INTERVAL: 368 MS
QT INTERVAL: 374 MS
QT INTERVAL: 392 MS
QTC INTERVAL: 440 MS
QTC INTERVAL: 471 MS
QTC INTERVAL: 484 MS

## 2022-02-14 NOTE — PAYOR COMM NOTE
"2/14/22 [facility]   [phone]    PATIENT ER ADMIT TO OBSERVATION ON 2/11/22 . FAXING CLINICAL.            Yuli Covarrubias (61 y.o. Female)             Date of Birth Social Security Number Address Home Phone MRN    1960  [address] [facility] [address] [phone] [id number]    [place] Marital Status             Other        Admission Date Admission Type Admitting Provider Attending Provider Department, Room/Bed    2/11/22 Emergency Raymundo Chairez, [facility] 4B, 404/1    Discharge Date Discharge Disposition Discharge Destination          2/12/2022 Home or Self Care              Attending Provider: (none)   Allergies: No Known Allergies    Isolation: None   Infection: COVID (rule out) (02/11/22)   Code Status: Prior   Advance Care Planning Activity    Ht: 167.6 cm (65.98\")   Wt: 92.1 kg (203 lb 0.7 oz)    Admission Cmt: None   Principal Problem: None                Active Insurance as of 2/11/2022     Primary Coverage     Payor Plan Insurance Group Employer/Plan Group    [facility] OPTUM      Payor Plan Address Payor Plan Phone Number Payor Plan Fax Number Effective Dates    [address] [phone]  1/1/2022 - None Entered    [facility] [id number]       Subscriber Name Subscriber Birth Date Member ID       YULI COVARRUBIAS 1960 [id number]                 Emergency Contacts      (Rel.) Home Phone Work Phone Mobile Phone    HARLEY COVARRUBIAS (Daughter) [phone] -- [phone]    NateDenae (Daughter) [phone] -- [phone]    Ajith,Aunt Melissa Doris (&bill) (Relative) [phone]    Jessika Jane (Mother) [phone] -- [phone]            [facility] Encounter Date/Time: 2/11/2022 [facility]6   Hospital Account: [id number]    MRN: [id number]   Patient:  Yuli Covarrubias   Contact Serial #: [id number]   SSN:     "      ENCOUNTER             Patient Class: Observation   Unit: [address]   Hospital Service: Cardiology     Bed: 404/1   Admitting Provider: Raymundo Chairez*   Referring Physician: Provider, No Known   Attending Provider:     Adm Diagnosis: Precordial pain [R07.2]               PATIENT          Name: Yuli Covarrubias : 1960 (61 yrs)   Address: [address] [place] [address] Sex: Female   City: [place] [address]   County: [place]   Marital Status:  Ethnicity: NOT                                                                              Race: WHITE   Primary Care Provider: Provider, No Known Patients Phone: Home Phone: [phone]     Mobile Phone: [phone]   EMERGENCY CONTACT   Contact Name Legal Guardian? Relationship to Patient Home Phone Work Phone   1. HARLEY COVARRUBIAS  2. Denae Sullivan      Daughter  Daughter [phone]           GUARANTOR            Guarantor: Yuli Covarrubias     : 1960   Address: [address] [place] [address] Sex: Female     [place] [address]     Relation to Patient: Self       Home Phone: [phone]   Guarantor ID: [id number]       Work Phone:     GUARANTOR EMPLOYER   Employer:           Status:     COVERAGE          PRIMARY INSURANCE   Payor: [facility] Plan: [facility] OPTUM   Group Number:   Insurance Type: INDEMNITY   Subscriber Name: YULI COVARRUBIAS Subscriber : 1960   Subscriber ID: [id number] Coverage Address: [address]  [place] [address]   Pat. Rel. to Subscriber: Self Coverage Phone: [phone]   SECONDARY INSURANCE   Payor: N/A Plan: N/A   Group Number:   Insurance Type:     Subscriber Name:   Subscriber :     Subscriber ID:   Coverage Address: [facility]  [address] [address]  [place] [address]   Pat. Rel. to Subscriber:   Coverage Phone: [phone]      Contact Serial # ([id number])  `       2022    Chart ID ([id number])            Encounter  Information    Encounter Information    Department Encounter #   2/11/2022 12:26 PM  PAD 4B [id number]     Oxana Stanford PA-C   Physician Assistant   Cardiology   H&P      Attested   Date of Service:  02/11/22 1424   Creation Time:  02/11/22 1424              Attested        Attestation signed by Raymundo Chairez DO at 02/11/22 8850   I have reviewed this documentation and agree.         Expand AllCollapse All          Show:Clear all  [x]Manual[x]Template[]Copied    Added by:  [x]Oxana Stanford PA-C      []Frantz for details    [facility], [facility] History and Physical      Patient Care Team:  Provider, No Known as PCP - General     Chief complaint CP        Subjective         Patient is a 61 y.o. female without previous heart history.  She has been experiencing intermittent CP for over a week.  It sometimes radiates to her back and jaw.  She has associated SOB and worsening SOB since having COVID in November.  The pain occurs at rest and not exertion. [facility] [place] [facility].  She left [place].  Now one week later, she is still having CP and comes to ER here.      Review of Systems  A 10-point review of systems is obtained and negative except for otherwise mentioned above.     History  Medical History        Past Medical History:   Diagnosis Date   • Hyperlipidemia     • Hypertension                         Objective         Vital Signs  Temp:  [98.2 °F (36.8 °C)] 98.2 °F (36.8 °C)  Heart Rate:  [] 93  Resp:  [22] 22  BP: (167-206)/(103-135) 167/106     Physical Exam:                General Appearance:    Alert, cooperative, in no acute distress   Head:    Normocephalic, without obvious abnormality, atraumatic   Eyes:            Lids and lashes normal, conjunctivae and sclerae normal, no   icterus, PERRLA, EOMI   Throat:   Oral mucosa pink and moist   Neck:   No adenopathy, supple,  trachea midline, no thyromegaly, no   carotid bruit, no JVD   Lungs:     Clear to auscultation bilaterally,respirations regular, even     and unlabored    Heart:    Regular rhythm and normal rate, normal S1 and S2, no            murmur, no gallop, no rub, no click   Chest Wall:    No abnormalities observed   Abdomen:     Normal bowel sounds present in all four quadrants, no       masses, no organomegaly, soft non-tender, non-distended    Extremities:   No edema, no cyanosis, no clubbing   Pulses:   Pulses palpable and equal bilaterally   Skin:   No bleeding, bruising or rash   Psychiatric:   Displays appropriate mood and affect      Results Review:    I reviewed the patient's new clinical results.          Results from last 7 days   Lab Units 02/11/22  1240   WBC 10*3/mm3 8.62   HEMOGLOBIN g/dL 14.1   HEMATOCRIT % 43.6   PLATELETS 10*3/mm3 206           Results from last 7 days   Lab Units 02/11/22  1240   SODIUM mmol/L 142   POTASSIUM mmol/L 3.8   CHLORIDE mmol/L 103   CO2 mmol/L 31.0*   BUN mg/dL 23   CREATININE mg/dL 0.63   GLUCOSE mg/dL 168*   CALCIUM mg/dL 9.6           Results from last 7 days   Lab Units 02/11/22  1240   SODIUM mmol/L 142   POTASSIUM mmol/L 3.8   CHLORIDE mmol/L 103   CO2 mmol/L 31.0*   BUN mg/dL 23   CREATININE mg/dL 0.63   CALCIUM mg/dL 9.6   BILIRUBIN mg/dL 0.4   ALK PHOS U/L 95   ALT (SGPT) U/L 10   AST (SGOT) U/L 14   GLUCOSE mg/dL 168*           Results from last 7 days   Lab Units 02/11/22  1240   TROPONIN T ng/mL <0.010               Assessment/Plan           Precordial pain  HTN     Admit to telemetry.  Monitor serial cardiac enzymes.  Request records from [facility].  Adjust antihypertensives.  Home med list is not complete at this time.  May need stress testing if cardiac enzymes remain negative but will await records before deciding.  Further recommendations to follow from Dr. Chairez.     I discussed the patients findings and my recommendations with patient and primary care  team.      Oxana Stanford PA-C  02/11/22  14:25 CST                        Cosigned by: Raymundo Chairez DO at 02/11/22 1835      ED to Hosp-Admission (Discharged) on 2/11/2022     ED to Hosp-Admission (Discharged) on 2/11/2022    Raymundo Escobar DO   Physician   Cardiology   Op Note      Signed   Date of Service:  02/12/22 1123   Creation Time:  02/12/22 1158           Case Time:  Procedures:  Surgeons:    02/12/22 1123 Left Heart Cath    Raymundo Chairez DO               Signed                   Show:Clear all  [x]Manual[x]Template[]Copied    Added by:  [x]Raymundo Chairez DO      []Frantz for details      [facility]  Date of procedure: 2/12/2022     Procedures performed:     1.  Access to the right radial artery via modified Seldinger technique  2.  Left heart catheterization with retrograde crosscut aortic valve  3.  LV ventriculography  4.  Selective bilateral coronary angiography  5.  Conscious sedation with continuous hemodynamic monitoring for 15 minutes  6.  Patent hemostasis achieved in the right radial artery using a TR band applied to the right wrist once the sheath was pulled           Risk, Benefits, and Alternatives discussed with the patient and/or family.  Plan is for moderate sedation, and the patient agrees to proceed with the procedure.  An immediate assessment was done prior to the administration of moderate sedation        Indication: Unstable angina with dynamic EKG changes  Premedication: Versed, Fentanyl  Contrast: Isovue 84cc  Radiation: Flouro time= 2.3 minutes. Air Kerma= 252 mGy  Catheters: 5F TIG, pigtail        Procedural details:  The patient was brought to the cath lab and prepped and draped in the usual fashion.  Access was obtained in the right radial artery via modified Seldinger technique.  A 6 Guinean sheath was placed into the artery and flushed.  A TIG catheter was inserted and used to engage the left main.   Selective left coronary angiography was performed in multiple views.  This catheter was then used to engage the right and selective right coronary angiography was performed in multiple views.  This catheter was then exchanged for a pigtail catheter which was used to cross the aortic valve into the left ventricle where pressures were recorded.  LV ventriculography was then performed.  The catheter was withdrawn back cross the aortic valve and again pressures were recorded.  Everything was then withdrawn to the sheath and the sheath was flushed.  Patent hemostasis was achieved in the right radial artery access site using a TR band applied to the right wrist once the sheath was pulled.  Patient tolerated the procedure well without any complications.  She left the Cath Lab in stable condition.     I supervised the administration of conscious sedation by nursing staff throughout the case.  First dose was given at 1140 and the end of my face-to-face encounter was at 1156, accounting for a total of 15 minutes of supervision.  During the case, continuous pulse oximetry, heart rate, blood pressure, and patient status were monitored.      Findings:     Hemodynamics:    Aortic: 158/107 mmHg  LV: 147/19 mmHg  LVEDP: 31 mmHg     Left ventriculography:  1.  Contractility of the left ventricle was normal.  Estimated ejection fraction is 60%  2.  There is no significant gradient across the aortic valve on pullback           Selective coronary angiography:   Left main: Left main is a very large caliber vessel that bifurcates into the LAD and left circumflex coronary arteries, no angiographic evidence of stenosis, VAIBHAV-3 flow  LAD: The LAD is a large-caliber vessel that wraps around to the apex, no angiographic evidence of stenosis, VAIBHAV-3 flow  Diagonals: The first diagonal is moderate caliber with no significant disease, the second diagonal is moderate caliber with no significant disease, the third diagonal small caliber  Left  circumflex: Left circumflex is a large-caliber vessel with no angiographic evidence of stenosis, VAIBHAV-3 flow  Obtuse marginals: The first obtuse marginal is a large-caliber vessel that has no significant disease  Right coronary artery: The RCA is a very large, dominant vessel with no angiographic evidence of stenosis, VAIBHAV-3 flow  PDA/VANIA: Both of these are large caliber vessels with no significant disease  Please note: All of patient's coronary arteries are very tortuous and consistent with longstanding history of hypertensive disease.                 Estimated Blood Loss: Minimal     Specimens: None     Complications: None     Impression:  1.  No significant epicardial coronary artery disease as described above  2.  Hypertensive emergency-likely etiology for her significant chest pain and EKG changes  3.  Elevated left ventricular end-diastolic pressure  4.  Anxiety              Plan:   1.  TR band off in 2 hours  2.  Maximize patient's antihypertension regimen  3.  Obtain transthoracic echocardiogram now  4.  Okay for discharge home later today if we can get blood pressure under control and transthoracic echocardiogram does not show any significant findings  5.  Close follow-up with cardiology as an outpatient           Raymundo Chairez DO  Interventional cardiology  [facility]  February 12, 2022               ED to Hosp-Admission (Discharged) on 2/11/2022     ED to Hosp-Admission (Discharged) on 2/11/2022        Department Encounter #   2/11/2022 12:26 PM BH PAD 4B [id number]     Charles Worthy APRN   Nurse Practitioner   Cardiology   Progress Notes      Attested   Date of Service:  02/12/22 0831   Creation Time:  02/12/22 0831              Attested        Attestation signed by Raymundo Chairez DO at 02/12/22 1940   I have reviewed this documentation and agree.         Expand AllCollapse All          Show:Clear all  [x]Manual[x]Template[]Copied    Added by:  [x]Charles Worthy  "EBER      []Frantz for details    [facility] -  Progress Note      LOS: 0 days   Patient Care Team:  Provider, No Known as PCP - General     Chief Complaint: Chest Pain         Subjective         Interval History:   Patient was moved out to the floor last night after weaning off Cardene. Patient notes she had a bad night and did not sleep well. She notes \"I did not leave AMA, [facility].\" But she does note that she \"didn't want to have it done there because I didn't want to wait around for days.\" She notes she tried to get an outpatient cath arranged this week but was not able to do this in a timely manner. She then looked up hospitals and decided to come to our hospital. She notes she had a CT of her chest and was told she had pulmonary hypertension, no aneurysm and \"ischemic plaque\"- but she cannot tell me of where. She notes she has had this pain off and on since last Friday when she went to [facility]. She notes she initially though it was a \"stomach ulcer\" but none of her GI medications helped. She notes it feels like a \"catch in her back.\" She notes it almost felt like if I loosened my bra it would help but it did not.         Review of Systems:      Review of Systems   Respiratory: Positive for chest tightness.    Cardiovascular: Positive for chest pain.   Psychiatric/Behavioral: Positive for agitation. The patient is nervous/anxious.                          Vital Sign Min/Max for last 24 hours  Temp  Min: 97.3 °F (36.3 °C)  Max: 98.2 °F (36.8 °C)   BP  Min: 119/47  Max: 206/135   Pulse  Min: 66  Max: 106   Resp  Min: 18  Max: 27   SpO2  Min: 84 %  Max: 96 %   No data recorded   Weight  Min: 92.1 kg (203 lb)  Max: 92.1 kg (203 lb)      Vitals             02/11/22 1958   Weight: 92.1 kg (203 lb)            Telemetry: NSR         Physical Exam:     Constitutional:       Appearance: Healthy appearance. Not in distress.   Eyes:      Pupils: Pupils are equal, " round, and reactive to light.   HENT:      Head: Normocephalic and atraumatic.      Nose: Nose normal.    Mouth/Throat:      Dentition: Normal.   Pulmonary:      Effort: Pulmonary effort is normal.      Breath sounds: Normal breath sounds.   Chest:      Chest wall: Not tender to palpatation.   Cardiovascular:      PMI at left midclavicular line. Normal rate. Regular rhythm.      Murmurs: There is no murmur.   Pulses:     Intact distal pulses.   Edema:     Peripheral edema absent.   Abdominal:      General: Bowel sounds are normal.      Palpations: Abdomen is soft.   Musculoskeletal: Normal range of motion.      Cervical back: Normal range of motion. Skin:     General: Skin is warm and dry.   Neurological:      Mental Status: Alert, oriented to person, place, and time and oriented to person, place and time.   Psychiatric:         Attention and Perception: Attention normal.         Mood and Affect: Mood is anxious.         Results Review:            Lab Results (last 72 hours)      Procedure Component Value Units Date/Time     Troponin [[id number]]  (Normal) Collected: 02/11/22 1456     Specimen: Blood Updated: 02/11/22 1522       Troponin T <0.010 ng/mL       Narrative:       Troponin T Reference Range:  <= 0.03 ng/mL-   Negative for AMI  >0.03 ng/mL-     Abnormal for myocardial necrosis.  Clinicians would have to utilize clinical acumen, EKG, Troponin and serial changes to determine if it is an Acute Myocardial Infarction or myocardial injury due to an underlying chronic condition.         Results may be falsely decreased if patient taking Biotin.        COVID-19,[facility] [employer] IN-HOUSE,Nasal Swab No Transport Media 3-4 HR TAT - Swab, Nasal Cavity [[id number]]  (Normal) Collected: 02/11/22 1243     Specimen: Swab from Nasal Cavity Updated: 02/11/22 1344       COVID19 Not Detected     Narrative:       Fact sheet for providers: https://www.fda.gov/media/432033/download      Fact sheet for patients:  https://www.fda.gov/media/428483/download     Test performed by PCR.     Consider negative results in combination with clinical observations, patient history, and epidemiological information.     Comprehensive Metabolic Panel [[id number]]  (Abnormal) Collected: 02/11/22 1240     Specimen: Blood Updated: 02/11/22 1317       Glucose 168 mg/dL         BUN 23 mg/dL         Creatinine 0.63 mg/dL         Sodium 142 mmol/L         Potassium 3.8 mmol/L         Chloride 103 mmol/L         CO2 31.0 mmol/L         Calcium 9.6 mg/dL         Total Protein 7.2 g/dL         Albumin 3.80 g/dL         ALT (SGPT) 10 U/L         AST (SGOT) 14 U/L         Alkaline Phosphatase 95 U/L         Total Bilirubin 0.4 mg/dL         eGFR Non African Amer 96 mL/min/1.73         Globulin 3.4 gm/dL         A/G Ratio 1.1 g/dL         BUN/Creatinine Ratio 36.5       Anion Gap 8.0 mmol/L       Narrative:       GFR Normal >60  Chronic Kidney Disease <60  Kidney Failure <15        Troponin [[id number]]  (Normal) Collected: 02/11/22 1240     Specimen: Blood Updated: 02/11/22 1315       Troponin T <0.010 ng/mL       Narrative:       Troponin T Reference Range:  <= 0.03 ng/mL-   Negative for AMI  >0.03 ng/mL-     Abnormal for myocardial necrosis.  Clinicians would have to utilize clinical acumen, EKG, Troponin and serial changes to determine if it is an Acute Myocardial Infarction or myocardial injury due to an underlying chronic condition.         Results may be falsely decreased if patient taking Biotin.        BNP [[id number]]  (Abnormal) Collected: 02/11/22 1240     Specimen: Blood Updated: 02/11/22 1314       proBNP 2,483.0 pg/mL       Narrative:       Among patients with dyspnea, NT-proBNP is highly sensitive for the detection of acute congestive heart failure. In addition NT-proBNP of <300 pg/ml effectively rules out acute congestive heart failure with 99% negative predictive value.     Results may be falsely decreased if patient taking Biotin.         D-dimer, Quantitative [[id number]]  (Normal) Collected: 02/11/22 1240     Specimen: Blood Updated: 02/11/22 1306       D-Dimer, Quantitative 0.37 mg/L (FEU)       Narrative:       Reference Range is 0-0.50 mg/L FEU. However, results <0.50 mg/L FEU tends to rule out DVT or PE. Results >0.50 mg/L FEU are not useful in predicting absence or presence of DVT or PE.        CBC & Differential [[id number]]  (Normal) Collected: 02/11/22 1240     Specimen: Blood Updated: 02/11/22 1257     Narrative:       The following orders were created for panel order CBC & Differential.  Procedure                               Abnormality         Status                     ---------                               -----------         ------                     CBC Auto Differential[[id number]]        Normal              Final result                  Please view results for these tests on the individual orders.     CBC Auto Differential [[id number]]  (Normal) Collected: 02/11/22 1240     Specimen: Blood Updated: 02/11/22 1257       WBC 8.62 10*3/mm3         RBC 4.67 10*6/mm3         Hemoglobin 14.1 g/dL         Hematocrit 43.6 %         MCV 93.4 fL         MCH 30.2 pg         MCHC 32.3 g/dL         RDW 13.8 %         RDW-SD 47.7 fl         MPV 11.6 fL         Platelets 206 10*3/mm3         Neutrophil % 63.6 %         Lymphocyte % 25.2 %         Monocyte % 6.5 %         Eosinophil % 3.6 %         Basophil % 0.9 %         Immature Grans % 0.2 %         Neutrophils, Absolute 5.48 10*3/mm3         Lymphocytes, Absolute 2.17 10*3/mm3         Monocytes, Absolute 0.56 10*3/mm3         Eosinophils, Absolute 0.31 10*3/mm3         Basophils, Absolute 0.08 10*3/mm3         Immature Grans, Absolute 0.02 10*3/mm3         nRBC 0.0 /100 WBC            Medication Review: yes  Current Medications             Current Facility-Administered Medications   Medication Dose Route Frequency Provider Last Rate Last Admin   • acetaminophen (TYLENOL) tablet 650 mg  650 mg  Oral Q6H PRN Raymundo Chairez DO   650 mg at 02/11/22 2141   • ALPRAZolam (XANAX) tablet 0.25 mg  0.25 mg Oral TID PRN Charles Worthy APRN       • carvedilol (COREG) tablet 25 mg  25 mg Oral BID With Meals Charles Worthy APRN   25 mg at 02/12/22 0832   • enoxaparin (LOVENOX) syringe 40 mg  40 mg Subcutaneous Q24H Oxana Stanford PA-C   40 mg at 02/11/22 1625   • hydrALAZINE (APRESOLINE) injection 10 mg  10 mg Intravenous Q6H PRN Raymundo Chairez DO   10 mg at 02/12/22 0823   • losartan (COZAAR) tablet 50 mg  50 mg Oral Q24H Charles Worthy APRN   50 mg at 02/12/22 0832   • niCARdipine (CARDENE) 25 mg in 250 mL NS infusion  5-15 mg/hr Intravenous Titrated Kiran Lilly MD   Held at 02/11/22 2015   • nitroglycerin (NITROSTAT) SL tablet 0.4 mg  0.4 mg Sublingual Q5 Min PRN Oxana Stanford PA-C   0.4 mg at 02/11/22 1331   • nitroglycerin (NITROSTAT) SL tablet 0.4 mg  0.4 mg Sublingual Q5 Min PRN Oxana Stanford PA-C       • sodium chloride 0.9 % flush 10 mL  10 mL Intravenous Q12H Oxana Stanford PA-C   10 mL at 02/11/22 2257   • sodium chloride 0.9 % flush 10 mL  10 mL Intravenous PRN Oxana Stanford PA-C                         Assessment/Plan           Precordial pain    Chest pain    Essential hypertension    Anxiety     Plan:  Chest Pain- Patient notes she had a bad night and did not sleep. She notes she had a CT at [facility] and was told she did not have an aneurysm but there was concern over pulmonary hypertension. Pain is well controlled at this time. Will check troponin and EKG this morning as patient reports she hurt most of the night. Check Echo. Patient does have ST depression on EKG, No previous EKG for compare.      Hypertension- Blood Pressure was controlled on Cardene which was weaned off. BP now elevated again this am. Will give PRN IV Hydralazine and start home doses of Coreg and Losartan. She notes her BP has been  "running high for some time. I suspect some of this is driven by anxiety.      Anxiety- I offered patient PRN medication but she notes she does not want at this time. She also notes she was recently started outpatient on something.      Lengthy discussion had with patient about plan of care. I am not able to locate records from [facility] at this time. She is currently pain free. Will repeat troponin and EKG this am. If no acute changes I did discuss patient may not have emergent heart cath today- she is very upset by this and notes this is why [facility] [place] because she did not want to \"sit around and wait\". She notes \"I want a heart cath like my dad had where you come in and have it and go home.\" This is why she traveled from [place] to out hospital because \"I thought you would do that here.\"         Electronically signed by EBER Gutierres, 02/12/22, 8:31 AM CST.                   Cosigned by: Raymundo Chairez,  at 02/12/22 1940      ED to Hosp-Admission (Discharged) on 2/11/2022     ED to Hosp-Admission (Discharged) on 2/11/2022                        No current facility-administered medications for this encounter.     Current Outpatient Medications   Medication Sig Dispense Refill   • carvedilol (COREG) 25 MG tablet Take 25 mg by mouth 2 (Two) Times a Day With Meals.     • cloNIDine (Catapres) 0.1 MG tablet Take 1 tablet by mouth Daily As Needed for High Blood Pressure. 30 tablet 1   • losartan (COZAAR) 100 MG tablet Take 1 tablet by mouth Daily. 30 tablet 11     "

## 2022-05-06 ENCOUNTER — TRANSCRIBE ORDERS (OUTPATIENT)
Dept: LAB | Facility: HOSPITAL | Age: 62
End: 2022-05-06

## 2022-05-06 ENCOUNTER — PRE-ADMISSION TESTING (OUTPATIENT)
Dept: PREADMISSION TESTING | Facility: HOSPITAL | Age: 62
End: 2022-05-06

## 2022-05-06 VITALS
BODY MASS INDEX: 31.5 KG/M2 | RESPIRATION RATE: 25 BRPM | DIASTOLIC BLOOD PRESSURE: 119 MMHG | OXYGEN SATURATION: 92 % | SYSTOLIC BLOOD PRESSURE: 197 MMHG | WEIGHT: 195.99 LBS | HEIGHT: 66 IN | HEART RATE: 80 BPM

## 2022-05-06 DIAGNOSIS — Z11.59 SCREENING FOR VIRAL DISEASE: Primary | ICD-10-CM

## 2022-05-06 LAB
ALBUMIN SERPL-MCNC: 3.9 G/DL (ref 3.5–5.2)
ALBUMIN/GLOB SERPL: 1.1 G/DL
ALP SERPL-CCNC: 81 U/L (ref 39–117)
ALT SERPL W P-5'-P-CCNC: 16 U/L (ref 1–33)
ANION GAP SERPL CALCULATED.3IONS-SCNC: 8 MMOL/L (ref 5–15)
AST SERPL-CCNC: 24 U/L (ref 1–32)
BASOPHILS # BLD AUTO: 0.07 10*3/MM3 (ref 0–0.2)
BASOPHILS NFR BLD AUTO: 0.9 % (ref 0–1.5)
BILIRUB SERPL-MCNC: 0.9 MG/DL (ref 0–1.2)
BUN SERPL-MCNC: 12 MG/DL (ref 8–23)
BUN/CREAT SERPL: 22.2 (ref 7–25)
CALCIUM SPEC-SCNC: 8.9 MG/DL (ref 8.6–10.5)
CHLORIDE SERPL-SCNC: 100 MMOL/L (ref 98–107)
CO2 SERPL-SCNC: 32 MMOL/L (ref 22–29)
CREAT SERPL-MCNC: 0.54 MG/DL (ref 0.57–1)
DEPRECATED RDW RBC AUTO: 53.9 FL (ref 37–54)
EGFRCR SERPLBLD CKD-EPI 2021: 103.6 ML/MIN/1.73
EOSINOPHIL # BLD AUTO: 0.29 10*3/MM3 (ref 0–0.4)
EOSINOPHIL NFR BLD AUTO: 3.8 % (ref 0.3–6.2)
ERYTHROCYTE [DISTWIDTH] IN BLOOD BY AUTOMATED COUNT: 14.6 % (ref 12.3–15.4)
GLOBULIN UR ELPH-MCNC: 3.6 GM/DL
GLUCOSE SERPL-MCNC: 190 MG/DL (ref 65–99)
HCT VFR BLD AUTO: 48.7 % (ref 34–46.6)
HGB BLD-MCNC: 15.5 G/DL (ref 12–15.9)
IMM GRANULOCYTES # BLD AUTO: 0.02 10*3/MM3 (ref 0–0.05)
IMM GRANULOCYTES NFR BLD AUTO: 0.3 % (ref 0–0.5)
LYMPHOCYTES # BLD AUTO: 1.74 10*3/MM3 (ref 0.7–3.1)
LYMPHOCYTES NFR BLD AUTO: 22.5 % (ref 19.6–45.3)
MCH RBC QN AUTO: 31.3 PG (ref 26.6–33)
MCHC RBC AUTO-ENTMCNC: 31.8 G/DL (ref 31.5–35.7)
MCV RBC AUTO: 98.2 FL (ref 79–97)
MONOCYTES # BLD AUTO: 0.31 10*3/MM3 (ref 0.1–0.9)
MONOCYTES NFR BLD AUTO: 4 % (ref 5–12)
NEUTROPHILS NFR BLD AUTO: 5.3 10*3/MM3 (ref 1.7–7)
NEUTROPHILS NFR BLD AUTO: 68.5 % (ref 42.7–76)
NRBC BLD AUTO-RTO: 0 /100 WBC (ref 0–0.2)
PLATELET # BLD AUTO: 196 10*3/MM3 (ref 140–450)
PMV BLD AUTO: 11.1 FL (ref 6–12)
POTASSIUM SERPL-SCNC: 3.6 MMOL/L (ref 3.5–5.2)
PROT SERPL-MCNC: 7.5 G/DL (ref 6–8.5)
RBC # BLD AUTO: 4.96 10*6/MM3 (ref 3.77–5.28)
SODIUM SERPL-SCNC: 140 MMOL/L (ref 136–145)
WBC NRBC COR # BLD: 7.73 10*3/MM3 (ref 3.4–10.8)

## 2022-05-06 PROCEDURE — 36415 COLL VENOUS BLD VENIPUNCTURE: CPT

## 2022-05-06 PROCEDURE — 85025 COMPLETE CBC W/AUTO DIFF WBC: CPT

## 2022-05-06 PROCEDURE — 80053 COMPREHEN METABOLIC PANEL: CPT

## 2022-05-06 NOTE — NURSING NOTE
"Spoke with Rafita Villar CRNA regarding patient's blood pressure.  I informed him of patient medications and up coming surgery.  He states to tell patient to take carvedilol and losartan morning of surgery. Patient states that her blood pressure \"always runs this way when I am at the hospital\"  She states she checks her blood pressure at home and it is always normal.  Patient was hospitalized in Feb. 2022 for chest pain and elevated blood pressure.  Cardiac workup was done at that time.  Her [facility] at [facility].  She denies any symptoms of head ache or chest pain today.  I instructed patient to continue to monitor blood pressure at home and if it is elevated as it was here today that she needs to go to ER or see her primary doctor ASAP.   She voiced understanding.     "

## 2022-05-06 NOTE — DISCHARGE INSTRUCTIONS
Before you come to the hospital        Arrival time: AS DIRECTED BY OFFICE     YOU MAY TAKE THE FOLLOWING MEDICATION(S) THE MORNING OF SURGERY WITH A SIP OF WATER: ***  Carvedilol  Hold losartan for 24 hours prior to surgery           ALL OTHER HOME MEDICATION CHECK WITH YOUR PHYSICIAN (especially if you are taking diabetes medicines or blood thinners)    Do not take any Erectile Dysfunction medications (EX: CIALIS, VIAGRA) 24 hours prior to surgery      If you were given and instructed to use a germ- killing soap, use as directed the night before surgery and the morning of surgery before coming to the hospital.       Eating and drinking restrictions  (It is extremely important that you follow these guidelines to prevent delay or cancelation of your procedure)   Up to 2 hours prior to the time you are told to arrive to the hospital - you may continue to drink clear liquids, such as water, clear fruit juice (no pulp), black coffee, and plain tea.          Eating and drinking restrictions prior to scheduled arrival time  Clear liquids (water, apple juice-no pulp) - 2 hours   Breast milk - 4 hours   Milk or drinks that contain milk, full liquids-  6 hours   Light meals or foods, such as toast or cereal- 6 hours   Heavy foods, such as meat, fried foods or fatty foods- 8 hours       Clear Liquids  Water and flavored water                                                                       Sugar water.  Ice or frozen ice pops.  Clear Fruit juices, such as cranberry juice and apple juice.  Black coffee.  Plain tea  Clear bouillon or broth.  Broth-based soups that have been strained.  Flavored gelatin.  Soda.  Gatorade or Powerade.  Full liquid examples  Juices that have pulp.  Frozen ice pops that contain fruit pieces.  Coffee with creamer  Milk.  Cream or cream-based soups.  Yogurt.              MANAGING PAIN AFTER SURGERY    We know you are probably wondering what your pain will be like after surgery.  Following  surgery it is unrealistic to expect you will not have pain.   Pain is how our bodies let us know that something is wrong or cautions us to be careful.  That said, our goal is to make your pain tolerable.    Methods we may use to treat your pain include (oral or IV medications, PCAs, epidurals, nerve blocks, etc.)   While some procedures require IV pain medications for a short time after surgery, transitioning to pain medications by mouth allows for better management of pain.   Your nurse will encourage you to take oral pain medications whenever possible.  IV medications work almost immediately, but only last a short while.  Taking medications by mouth allows for a more constant level of medication in your blood stream for a longer period of time.      Once your pain is out of control it is harder to get back under control.  It is important you are aware when your next dose of pain medication is due.  If you are admitted, your nurse may write the time of your next dose on the white board in your room to help you remember.      We are interested in your pain and encourage you to inform us about aggravating factors during your visit.   Many times a simple repositioning every few hours can make a big difference.    If your physician says it is okay, do not let your pain prevent you from getting out of bed. Be sure to call your nurse for assistance prior to getting up so you do not fall.      Before surgery, please decide your tolerable pain goal.  These faces help describe the pain ratings we use on a 0-10 scale.   Be prepared to tell us your goal and whether or not you take pain or anxiety medications at home.

## 2022-05-09 ENCOUNTER — LAB (OUTPATIENT)
Dept: LAB | Facility: HOSPITAL | Age: 62
End: 2022-05-09

## 2022-05-09 LAB — SARS-COV-2 ORF1AB RESP QL NAA+PROBE: NOT DETECTED

## 2022-05-09 PROCEDURE — C9803 HOPD COVID-19 SPEC COLLECT: HCPCS

## 2022-05-09 PROCEDURE — U0004 COV-19 TEST NON-CDC HGH THRU: HCPCS | Performed by: SPECIALIST

## 2022-05-11 ENCOUNTER — ANESTHESIA EVENT (OUTPATIENT)
Dept: PERIOP | Facility: HOSPITAL | Age: 62
End: 2022-05-11

## 2022-05-12 ENCOUNTER — APPOINTMENT (OUTPATIENT)
Dept: GENERAL RADIOLOGY | Facility: HOSPITAL | Age: 62
End: 2022-05-12

## 2022-05-12 ENCOUNTER — ANESTHESIA (OUTPATIENT)
Dept: PERIOP | Facility: HOSPITAL | Age: 62
End: 2022-05-12

## 2022-05-12 ENCOUNTER — HOSPITAL ENCOUNTER (OUTPATIENT)
Facility: HOSPITAL | Age: 62
Setting detail: HOSPITAL OUTPATIENT SURGERY
Discharge: HOME | End: 2022-05-12
Attending: SPECIALIST | Admitting: SPECIALIST

## 2022-05-12 VITALS
RESPIRATION RATE: 16 BRPM | HEIGHT: 66 IN | SYSTOLIC BLOOD PRESSURE: 164 MMHG | WEIGHT: 195.99 LBS | DIASTOLIC BLOOD PRESSURE: 101 MMHG | TEMPERATURE: 97.8 F | OXYGEN SATURATION: 90 % | HEART RATE: 85 BPM | BODY MASS INDEX: 31.5 KG/M2

## 2022-05-12 DIAGNOSIS — K81.9 CHOLECYSTITIS: ICD-10-CM

## 2022-05-12 PROCEDURE — 25010000002 DEXAMETHASONE PER 1 MG: Performed by: NURSE ANESTHETIST, CERTIFIED REGISTERED

## 2022-05-12 PROCEDURE — 25010000002 ONDANSETRON PER 1 MG: Performed by: NURSE ANESTHETIST, CERTIFIED REGISTERED

## 2022-05-12 PROCEDURE — 25010000002 MIDAZOLAM PER 1 MG: Performed by: ANESTHESIOLOGY

## 2022-05-12 PROCEDURE — 71045 X-RAY EXAM CHEST 1 VIEW: CPT

## 2022-05-12 PROCEDURE — 25010000002 PROPOFOL 10 MG/ML EMULSION: Performed by: NURSE ANESTHETIST, CERTIFIED REGISTERED

## 2022-05-12 PROCEDURE — 25010000002 FENTANYL CITRATE (PF) 100 MCG/2ML SOLUTION: Performed by: NURSE ANESTHETIST, CERTIFIED REGISTERED

## 2022-05-12 PROCEDURE — 94799 UNLISTED PULMONARY SVC/PX: CPT

## 2022-05-12 PROCEDURE — 94640 AIRWAY INHALATION TREATMENT: CPT

## 2022-05-12 PROCEDURE — 25010000002 CEFAZOLIN PER 500 MG: Performed by: SPECIALIST

## 2022-05-12 PROCEDURE — 94660 CPAP INITIATION&MGMT: CPT

## 2022-05-12 PROCEDURE — 88304 TISSUE EXAM BY PATHOLOGIST: CPT | Performed by: SPECIALIST

## 2022-05-12 DEVICE — LIGACLIP 10-M/L, 10MM ENDOSCOPIC ROTATING MULTIPLE CLIP APPLIERS
Type: IMPLANTABLE DEVICE | Site: ABDOMEN | Status: FUNCTIONAL
Brand: LIGACLIP

## 2022-05-12 RX ORDER — NALOXONE HCL 0.4 MG/ML
0.4 VIAL (ML) INJECTION AS NEEDED
Status: DISCONTINUED | OUTPATIENT
Start: 2022-05-12 | End: 2022-05-12 | Stop reason: HOSPADM

## 2022-05-12 RX ORDER — OXYCODONE AND ACETAMINOPHEN 10; 325 MG/1; MG/1
1 TABLET ORAL ONCE AS NEEDED
Status: DISCONTINUED | OUTPATIENT
Start: 2022-05-12 | End: 2022-05-12 | Stop reason: HOSPADM

## 2022-05-12 RX ORDER — LIDOCAINE HYDROCHLORIDE 20 MG/ML
INJECTION, SOLUTION EPIDURAL; INFILTRATION; INTRACAUDAL; PERINEURAL AS NEEDED
Status: DISCONTINUED | OUTPATIENT
Start: 2022-05-12 | End: 2022-05-12 | Stop reason: SURG

## 2022-05-12 RX ORDER — ROCURONIUM BROMIDE 10 MG/ML
INJECTION, SOLUTION INTRAVENOUS AS NEEDED
Status: DISCONTINUED | OUTPATIENT
Start: 2022-05-12 | End: 2022-05-12 | Stop reason: SURG

## 2022-05-12 RX ORDER — SODIUM CHLORIDE 0.9 % (FLUSH) 0.9 %
3 SYRINGE (ML) INJECTION AS NEEDED
Status: DISCONTINUED | OUTPATIENT
Start: 2022-05-12 | End: 2022-05-12 | Stop reason: HOSPADM

## 2022-05-12 RX ORDER — LIDOCAINE HYDROCHLORIDE 10 MG/ML
0.5 INJECTION, SOLUTION EPIDURAL; INFILTRATION; INTRACAUDAL; PERINEURAL ONCE AS NEEDED
Status: DISCONTINUED | OUTPATIENT
Start: 2022-05-12 | End: 2022-05-12 | Stop reason: HOSPADM

## 2022-05-12 RX ORDER — BUPIVACAINE HYDROCHLORIDE AND EPINEPHRINE 2.5; 5 MG/ML; UG/ML
INJECTION, SOLUTION INFILTRATION; PERINEURAL AS NEEDED
Status: DISCONTINUED | OUTPATIENT
Start: 2022-05-12 | End: 2022-05-12 | Stop reason: HOSPADM

## 2022-05-12 RX ORDER — IPRATROPIUM BROMIDE AND ALBUTEROL SULFATE 2.5; .5 MG/3ML; MG/3ML
3 SOLUTION RESPIRATORY (INHALATION)
Status: DISCONTINUED | OUTPATIENT
Start: 2022-05-12 | End: 2022-05-12 | Stop reason: HOSPADM

## 2022-05-12 RX ORDER — FENTANYL CITRATE 50 UG/ML
25 INJECTION, SOLUTION INTRAMUSCULAR; INTRAVENOUS
Status: DISCONTINUED | OUTPATIENT
Start: 2022-05-12 | End: 2022-05-12 | Stop reason: HOSPADM

## 2022-05-12 RX ORDER — SODIUM CHLORIDE 0.9 % (FLUSH) 0.9 %
3 SYRINGE (ML) INJECTION EVERY 12 HOURS SCHEDULED
Status: DISCONTINUED | OUTPATIENT
Start: 2022-05-12 | End: 2022-05-12 | Stop reason: HOSPADM

## 2022-05-12 RX ORDER — OXYCODONE HYDROCHLORIDE AND ACETAMINOPHEN 5; 325 MG/1; MG/1
1 TABLET ORAL EVERY 4 HOURS PRN
Qty: 30 TABLET | Refills: 0 | Status: SHIPPED | OUTPATIENT
Start: 2022-05-12

## 2022-05-12 RX ORDER — ONDANSETRON 2 MG/ML
INJECTION INTRAMUSCULAR; INTRAVENOUS AS NEEDED
Status: DISCONTINUED | OUTPATIENT
Start: 2022-05-12 | End: 2022-05-12 | Stop reason: SURG

## 2022-05-12 RX ORDER — ACETAMINOPHEN 500 MG
1000 TABLET ORAL ONCE
Status: COMPLETED | OUTPATIENT
Start: 2022-05-12 | End: 2022-05-12

## 2022-05-12 RX ORDER — DEXAMETHASONE SODIUM PHOSPHATE 4 MG/ML
INJECTION, SOLUTION INTRA-ARTICULAR; INTRALESIONAL; INTRAMUSCULAR; INTRAVENOUS; SOFT TISSUE AS NEEDED
Status: DISCONTINUED | OUTPATIENT
Start: 2022-05-12 | End: 2022-05-12 | Stop reason: SURG

## 2022-05-12 RX ORDER — OXYCODONE AND ACETAMINOPHEN 7.5; 325 MG/1; MG/1
2 TABLET ORAL EVERY 4 HOURS PRN
Status: DISCONTINUED | OUTPATIENT
Start: 2022-05-12 | End: 2022-05-12 | Stop reason: HOSPADM

## 2022-05-12 RX ORDER — NEOSTIGMINE METHYLSULFATE 5 MG/5 ML
SYRINGE (ML) INTRAVENOUS AS NEEDED
Status: DISCONTINUED | OUTPATIENT
Start: 2022-05-12 | End: 2022-05-12 | Stop reason: SURG

## 2022-05-12 RX ORDER — SODIUM CHLORIDE, SODIUM LACTATE, POTASSIUM CHLORIDE, CALCIUM CHLORIDE 600; 310; 30; 20 MG/100ML; MG/100ML; MG/100ML; MG/100ML
1000 INJECTION, SOLUTION INTRAVENOUS CONTINUOUS
Status: DISCONTINUED | OUTPATIENT
Start: 2022-05-12 | End: 2022-05-12 | Stop reason: HOSPADM

## 2022-05-12 RX ORDER — MAGNESIUM HYDROXIDE 1200 MG/15ML
LIQUID ORAL AS NEEDED
Status: DISCONTINUED | OUTPATIENT
Start: 2022-05-12 | End: 2022-05-12 | Stop reason: HOSPADM

## 2022-05-12 RX ORDER — SODIUM CHLORIDE 9 MG/ML
INJECTION, SOLUTION INTRAVENOUS AS NEEDED
Status: DISCONTINUED | OUTPATIENT
Start: 2022-05-12 | End: 2022-05-12 | Stop reason: HOSPADM

## 2022-05-12 RX ORDER — SODIUM CHLORIDE 0.9 % (FLUSH) 0.9 %
3-10 SYRINGE (ML) INJECTION AS NEEDED
Status: DISCONTINUED | OUTPATIENT
Start: 2022-05-12 | End: 2022-05-12 | Stop reason: HOSPADM

## 2022-05-12 RX ORDER — FLUMAZENIL 0.1 MG/ML
0.2 INJECTION INTRAVENOUS AS NEEDED
Status: DISCONTINUED | OUTPATIENT
Start: 2022-05-12 | End: 2022-05-12 | Stop reason: HOSPADM

## 2022-05-12 RX ORDER — FENTANYL CITRATE 50 UG/ML
INJECTION, SOLUTION INTRAMUSCULAR; INTRAVENOUS AS NEEDED
Status: DISCONTINUED | OUTPATIENT
Start: 2022-05-12 | End: 2022-05-12 | Stop reason: SURG

## 2022-05-12 RX ORDER — MIDAZOLAM HYDROCHLORIDE 1 MG/ML
1 INJECTION INTRAMUSCULAR; INTRAVENOUS
Status: COMPLETED | OUTPATIENT
Start: 2022-05-12 | End: 2022-05-12

## 2022-05-12 RX ORDER — OXYCODONE HYDROCHLORIDE AND ACETAMINOPHEN 5; 325 MG/1; MG/1
1 TABLET ORAL ONCE AS NEEDED
Status: DISCONTINUED | OUTPATIENT
Start: 2022-05-12 | End: 2022-05-12 | Stop reason: HOSPADM

## 2022-05-12 RX ORDER — LABETALOL HYDROCHLORIDE 5 MG/ML
5 INJECTION, SOLUTION INTRAVENOUS
Status: DISCONTINUED | OUTPATIENT
Start: 2022-05-12 | End: 2022-05-12 | Stop reason: HOSPADM

## 2022-05-12 RX ORDER — PROPOFOL 10 MG/ML
VIAL (ML) INTRAVENOUS AS NEEDED
Status: DISCONTINUED | OUTPATIENT
Start: 2022-05-12 | End: 2022-05-12 | Stop reason: SURG

## 2022-05-12 RX ORDER — SODIUM CHLORIDE, SODIUM LACTATE, POTASSIUM CHLORIDE, CALCIUM CHLORIDE 600; 310; 30; 20 MG/100ML; MG/100ML; MG/100ML; MG/100ML
100 INJECTION, SOLUTION INTRAVENOUS CONTINUOUS
Status: DISCONTINUED | OUTPATIENT
Start: 2022-05-12 | End: 2022-05-12 | Stop reason: HOSPADM

## 2022-05-12 RX ADMIN — ONDANSETRON 4 MG: 2 INJECTION INTRAMUSCULAR; INTRAVENOUS at 07:22

## 2022-05-12 RX ADMIN — GLYCOPYRROLATE 0.2 MG: 0.2 INJECTION INTRAMUSCULAR; INTRAVENOUS at 07:33

## 2022-05-12 RX ADMIN — FENTANYL CITRATE 50 MCG: 50 INJECTION, SOLUTION INTRAMUSCULAR; INTRAVENOUS at 07:10

## 2022-05-12 RX ADMIN — PROPOFOL 150 MG: 10 INJECTION, EMULSION INTRAVENOUS at 07:10

## 2022-05-12 RX ADMIN — ROCURONIUM BROMIDE 30 MG: 10 INJECTION INTRAVENOUS at 07:10

## 2022-05-12 RX ADMIN — GLYCOPYRROLATE 0.4 MG: 0.2 INJECTION INTRAMUSCULAR; INTRAVENOUS at 07:42

## 2022-05-12 RX ADMIN — SUGAMMADEX 200 MG: 100 INJECTION, SOLUTION INTRAVENOUS at 08:05

## 2022-05-12 RX ADMIN — MIDAZOLAM 1 MG: 1 INJECTION INTRAMUSCULAR; INTRAVENOUS at 06:44

## 2022-05-12 RX ADMIN — FENTANYL CITRATE 50 MCG: 50 INJECTION, SOLUTION INTRAMUSCULAR; INTRAVENOUS at 07:28

## 2022-05-12 RX ADMIN — ACETAMINOPHEN 1000 MG: 500 TABLET ORAL at 06:44

## 2022-05-12 RX ADMIN — SODIUM CHLORIDE, POTASSIUM CHLORIDE, SODIUM LACTATE AND CALCIUM CHLORIDE 1000 ML: 600; 310; 30; 20 INJECTION, SOLUTION INTRAVENOUS at 06:00

## 2022-05-12 RX ADMIN — DEXAMETHASONE SODIUM PHOSPHATE 4 MG: 4 INJECTION, SOLUTION INTRA-ARTICULAR; INTRALESIONAL; INTRAMUSCULAR; INTRAVENOUS; SOFT TISSUE at 07:16

## 2022-05-12 RX ADMIN — MIDAZOLAM 1 MG: 1 INJECTION INTRAMUSCULAR; INTRAVENOUS at 07:01

## 2022-05-12 RX ADMIN — Medication 4 MG: at 07:42

## 2022-05-12 RX ADMIN — IPRATROPIUM BROMIDE AND ALBUTEROL SULFATE 3 ML: .5; 3 SOLUTION RESPIRATORY (INHALATION) at 10:03

## 2022-05-12 RX ADMIN — LIDOCAINE HYDROCHLORIDE 100 MG: 20 INJECTION, SOLUTION EPIDURAL; INFILTRATION; INTRACAUDAL; PERINEURAL at 07:10

## 2022-05-12 NOTE — ANESTHESIA POSTPROCEDURE EVALUATION
"Patient: Yuli Covarrubias    Procedure Summary     Date: 05/12/22 Room / Location:  [facility] OR 06 /  [facility]    Anesthesia Start: 0704 Anesthesia Stop: 0821    Procedure: LAPAROSCOPIC CHOLECYSTECTOMY (N/A Abdomen) Diagnosis: (CHOLELITHIASIS)    Surgeons: Gloria Jordan MD Provider: Ebony Carrera CRNA    Anesthesia Type: general ASA Status: 2          Anesthesia Type: general    Vitals  Vitals Value Taken Time   /120 05/12/22 1215   Temp 97.8 °F (36.6 °C) 05/12/22 1215   Pulse 78 05/12/22 1215   Resp 16 05/12/22 1215   SpO2 90 % 05/12/22 1215           Post Anesthesia Care and Evaluation    Patient location during evaluation: PACU  Patient participation: complete - patient participated  Level of consciousness: awake and alert  Pain management: adequate  Airway patency: patent  Anesthetic complications: No anesthetic complications    Cardiovascular status: acceptable  Respiratory status: acceptable  Hydration status: acceptable    Comments: Blood pressure (!) 164/101, pulse 85, temperature 97.8 °F (36.6 °C), temperature source Temporal, resp. rate 16, height 167 cm (65.75\"), weight 88.9 kg (195 lb 15.8 oz), SpO2 90 %.    Pt discharged from PACU based on tom score >8      "

## 2022-05-12 NOTE — NURSING NOTE
"Patient states that she took both her coreg and cozaar this am. Her blood pressure remains elevated ,she states that she gets \"white coat syndrome\"   "

## 2022-05-12 NOTE — H&P
Gloria Jordan MD [facility] History and Physical     Referring Provider: Gloria Jordan MD    Patient Care Team:  Onelia Joe PA as PCP - General (Physician Assistant)    Chief complaint abdominal pain    Subjective .     History of present illness:  The patient is a 62 y.o. female who presents for cholecystectomy.    Review of Systems    Review of Systems - General ROS: negative  ENT ROS: negative  Respiratory ROS: no cough, shortness of breath, or wheezing  Cardiovascular ROS: no chest pain or dyspnea on exertion  Gastrointestinal ROS: no abdominal pain, change in bowel habits, or black or bloody stools  Genitourinary ROS: no dysuria, trouble voiding, or hematuria  Dermatological ROS: negative   Breast ROS: negative for breast lumps  Hematological and Lymphatic ROS: negative  Musculoskeletal ROS: negative   Neurological ROS: no TIA or stroke symptoms    Psychological ROS: negative  Endocrine ROS: negative    History  Past Medical History:   Diagnosis Date   • Cancer (HCC)     skin   • Hyperlipidemia    • Hypertension    ,   Past Surgical History:   Procedure Laterality Date   • CARDIAC CATHETERIZATION N/A 2/12/2022    Procedure: Left Heart Cath;  Surgeon: Raymundo Chairez DO;  Location:  [facility] CATH [facility];  Service: Cardiovascular;  Laterality: N/A;   • FOOT SURGERY     • LAPAROSCOPIC GASTRIC BANDING     • TONSILLECTOMY     , No family history on file.,   Social History     Tobacco Use   • Smoking status: Never Smoker   Substance Use Topics   • Alcohol use: Never   • Drug use: Never   ,   No medications prior to admission.    and Allergies:  Patient has no known allergies.  No current facility-administered medications for this encounter.    Current Outpatient Medications:   •  carvedilol (COREG) 25 MG tablet, Take 25 mg by mouth 2 (Two) Times a Day With Meals., Disp: , Rfl:   •  losartan (COZAAR) 100 MG tablet, Take 1 tablet by mouth Daily., Disp: 30 tablet, Rfl: 11  •   oxyCODONE-acetaminophen (PERCOCET) 5-325 MG per tablet, Take 1 tablet by mouth Every 4 (Four) Hours As Needed (Pain)., Disp: 30 tablet, Rfl: 0    Objective     Vital Signs   Temp:  [96.3 °F (35.7 °C)-97.8 °F (36.6 °C)] 97.8 °F (36.6 °C)  Heart Rate:  [] 85  Resp:  [14-26] 16  BP: (132-184)/() 164/101    Physical Exam:  General appearance - alert, well appearing, and in no distress  Mental status - alert, oriented to person, place, and time  Neck - supple, no significant adenopathy  Chest - clear to auscultation, no wheezes, rales or rhonchi, symmetric air entry  Heart - normal rate, regular rhythm, normal S1, S2, no murmurs, rubs, clicks or gallops  Abdomen - soft, nontender, nondistended, no masses or organomegaly  Neurological - alert, oriented, normal speech, no focal findings or movement disorder noted  Musculoskeletal - no joint tenderness, deformity or swelling  Extremities - peripheral pulses normal, no pedal edema, no clubbing or cyanosis    Results Review:     Lab Results (last 24 hours)     Procedure Component Value Units Date/Time    Tissue Pathology Exam [[id number]] Collected: 05/12/22 0654    Specimen: Tissue from Gallbladder Updated: 05/12/22 0835        Imaging Results (Last 24 Hours)     Procedure Component Value Units Date/Time    XR Chest 1 View [[id number]] Collected: 05/12/22 1225     Updated: 05/12/22 1229    Narrative:      EXAMINATION: XR CHEST 1 VW-     5/12/2022 11:12 AM CDT     HISTORY: low oxygen levels; K81.9-Cholecystitis, unspecified     1 view chest x-ray.     Comparison is made with February 11, 2022.     Heart size is normal.  The mediastinum is within normal limits.      The lungs are normally expanded with no pneumonia or pneumothorax.  Mild chronic appearing interstitial disease and mild atelectasis.  No congestive failure changes.                                                                       Impression:      1. No acute disease.        This report was finalized  on 05/12/2022 12:26 by Dr. Sohail Mayer MD.            Assessment & Plan       Symptomatic cholelithiasis    She will undergo laparoscopic cholecystectomy, possible cholangiogram, with possible need for conversion to open.  The risks, benefits, complications, and possible alternatives were discussed with the patient who agreed to proceed.      Gloria Jordan MD  05/12/22  18:57 CDT

## 2022-05-12 NOTE — OP NOTE
Preoperative diagnosis:  Symptomatic cholelithiasis  Postoperative diagnosis:  Same  Procedure:  Laparoscopic cholecystectomy  Surgeon: Gloria Jordan MD  Anesthesia:  Get loc  Ebl:  Minimal  Ivf:  See anesthesia  Indications: the patient is a 62-year-old female who presents for cholecystectomy.  The risks, benefits, complications, and possible alternatives were discussed with the patient who agreed to proceed.  Description of procedure: the patient was laid supine. The abdomen was prepped and draped.  The abdomen was entered with veress.  Trocars were placed.  The fundus and infundibulum were grasped and elevated.  The cystic duct and artery were skeletonized and identified. They were clipped proximally and distally and then transected. The gallbladder was freed from the liver bed with bovie. It was placed in an endocatch bag and removed. The fascia of the epigastric site was closed with 0 vicryl using an endostitch passer.  The skin was closed with 3-0 and 4-0 vicyrl.  Dry dressings were applied. The sponge, needle, and instrument counts were correct.  Complications: none  Disposition good to pacu  Findings:  Friable gallbladder

## 2022-05-12 NOTE — ANESTHESIA PREPROCEDURE EVALUATION
Anesthesia Evaluation     Patient summary reviewed   no history of anesthetic complications:  NPO Solid Status: > 6 hours  NPO Liquid Status: > 4 hours           Airway   Mallampati: II  TM distance: >3 FB  Neck ROM: full  Dental - normal exam     Pulmonary    (-) not a smoker  Cardiovascular   Exercise tolerance: good (4-7 METS)    (+) hypertension, hyperlipidemia,   (-) pacemaker, valvular problems/murmurs, past MI, cardiac stents, CABG    ROS comment: · Left ventricular ejection fraction appears to be 56 - 60%. Left ventricular systolic function is normal.  · The left ventricular cavity is moderately dilated. Left ventricular wall thickness is consistent with severe concentric hypertrophy        Neuro/Psych  (-) seizures, CVA  GI/Hepatic/Renal/Endo    (-) no renal disease, diabetes    Musculoskeletal     Abdominal    Substance History      OB/GYN          Other      history of cancer                    Anesthesia Plan    ASA 2     general     intravenous induction     Anesthetic plan, all risks, benefits, and alternatives have been provided, discussed and informed consent has been obtained with: patient.        CODE STATUS:

## 2022-05-12 NOTE — NURSING NOTE
Dr Kellogg @ bedside. Updated on pt o2 sat 87-92% on RA. Patient insisting she feels good, no SOB, and wants to go home now.

## 2022-05-13 LAB
CYTO UR: NORMAL
LAB AP CASE REPORT: NORMAL
Lab: NORMAL
PATH REPORT.FINAL DX SPEC: NORMAL
PATH REPORT.GROSS SPEC: NORMAL

## 2023-05-30 ENCOUNTER — LAB (OUTPATIENT)
Dept: LAB | Facility: HOSPITAL | Age: 63
End: 2023-05-30

## 2023-05-30 ENCOUNTER — OFFICE VISIT (OUTPATIENT)
Dept: CARDIOLOGY | Facility: CLINIC | Age: 63
End: 2023-05-30

## 2023-05-30 VITALS
DIASTOLIC BLOOD PRESSURE: 118 MMHG | OXYGEN SATURATION: 98 % | BODY MASS INDEX: 34.06 KG/M2 | WEIGHT: 217 LBS | HEIGHT: 67 IN | SYSTOLIC BLOOD PRESSURE: 172 MMHG | HEART RATE: 106 BPM

## 2023-05-30 DIAGNOSIS — E78.49 OTHER HYPERLIPIDEMIA: Chronic | ICD-10-CM

## 2023-05-30 DIAGNOSIS — I10 ESSENTIAL HYPERTENSION: Chronic | ICD-10-CM

## 2023-05-30 DIAGNOSIS — I50.9 ACUTE ON CHRONIC CONGESTIVE HEART FAILURE, UNSPECIFIED HEART FAILURE TYPE: ICD-10-CM

## 2023-05-30 DIAGNOSIS — I50.9 ACUTE ON CHRONIC CONGESTIVE HEART FAILURE, UNSPECIFIED HEART FAILURE TYPE: Primary | ICD-10-CM

## 2023-05-30 LAB
ALBUMIN SERPL-MCNC: 3.8 G/DL (ref 3.5–5.2)
ALBUMIN/GLOB SERPL: 1.1 G/DL
ALP SERPL-CCNC: 97 U/L (ref 39–117)
ALT SERPL W P-5'-P-CCNC: 16 U/L (ref 1–33)
ANION GAP SERPL CALCULATED.3IONS-SCNC: 12 MMOL/L (ref 5–15)
AST SERPL-CCNC: 23 U/L (ref 1–32)
BILIRUB SERPL-MCNC: 1.4 MG/DL (ref 0–1.2)
BUN SERPL-MCNC: 25 MG/DL (ref 8–23)
BUN/CREAT SERPL: 25.5 (ref 7–25)
CALCIUM SPEC-SCNC: 9.4 MG/DL (ref 8.6–10.5)
CHLORIDE SERPL-SCNC: 101 MMOL/L (ref 98–107)
CHOLEST SERPL-MCNC: 109 MG/DL (ref 0–200)
CO2 SERPL-SCNC: 28 MMOL/L (ref 22–29)
CREAT SERPL-MCNC: 0.98 MG/DL (ref 0.57–1)
EGFRCR SERPLBLD CKD-EPI 2021: 64.6 ML/MIN/1.73
GLOBULIN UR ELPH-MCNC: 3.4 GM/DL
GLUCOSE SERPL-MCNC: 163 MG/DL (ref 65–99)
HDLC SERPL-MCNC: 38 MG/DL (ref 40–60)
LDLC SERPL CALC-MCNC: 55 MG/DL (ref 0–100)
LDLC/HDLC SERPL: 1.45 {RATIO}
NT-PROBNP SERPL-MCNC: ABNORMAL PG/ML (ref 0–900)
POTASSIUM SERPL-SCNC: 3.4 MMOL/L (ref 3.5–5.2)
PROT SERPL-MCNC: 7.2 G/DL (ref 6–8.5)
SODIUM SERPL-SCNC: 141 MMOL/L (ref 136–145)
TRIGL SERPL-MCNC: 80 MG/DL (ref 0–150)
VLDLC SERPL-MCNC: 16 MG/DL (ref 5–40)

## 2023-05-30 PROCEDURE — 36415 COLL VENOUS BLD VENIPUNCTURE: CPT

## 2023-05-30 PROCEDURE — 80061 LIPID PANEL: CPT

## 2023-05-30 PROCEDURE — 80053 COMPREHEN METABOLIC PANEL: CPT

## 2023-05-30 PROCEDURE — 83880 ASSAY OF NATRIURETIC PEPTIDE: CPT

## 2023-05-30 RX ORDER — POTASSIUM CHLORIDE 20 MEQ/1
20 TABLET, EXTENDED RELEASE ORAL DAILY
COMMUNITY
End: 2023-05-30

## 2023-05-30 RX ORDER — CEPHALEXIN 500 MG/1
500 CAPSULE ORAL 4 TIMES DAILY
COMMUNITY

## 2023-05-30 RX ORDER — SPIRONOLACTONE 25 MG/1
25 TABLET ORAL DAILY
Qty: 30 TABLET | Refills: 11 | Status: SHIPPED | OUTPATIENT
Start: 2023-05-30 | End: 2023-05-31 | Stop reason: SDUPTHER

## 2023-05-30 RX ORDER — FUROSEMIDE 40 MG/1
40 TABLET ORAL DAILY
COMMUNITY

## 2023-05-30 NOTE — PROGRESS NOTES
"Reason For Visit:  CHF    Subjective        Yuli Covarrubias is a 63 y.o. female with the below pertinent PMH who is referred for CHF.    Patient reports that she started having swelling from abdomen down starting in March associated with fatigue and shortness of breath.  This worsened to the point where she was admitted about a month ago at [facility] where she was told she had CHF and a quite elevated BNP.  She has followed up with her PCP and was told that her BNP has improved.  She was transitioned from a thiazide diuretic to Lasix; she reports that she alternates taking 40 mg and 20 mg of Lasix depending on how she feels.  She does have a more robust urinary response to the higher dose of Lasix.  She does feel like her lower extremity swelling has improved since her hospitalization, but she still is having significant issues with lower extremity swelling and some orthopnea.  She also still feels that her abdomen is distended.  She denies any chest pain, lightheadedness, or significant palpitations.    Of note, she states that she had an echocardiogram done while in the hospital, we unfortunately do not have these results available.    ROS: Pertinent findings included above.    Pertinent PMH  - Hypertension  - Hyperlipidemia  - Diastolic CHF      Pertinent past medical, surgical, family, and social history were reviewed and updated in the EMR.      Current Outpatient Medications:     carvedilol (COREG) 25 MG tablet, Take 25 mg by mouth 2 (Two) Times a Day With Meals., Disp: , Rfl:     losartan (COZAAR) 100 MG tablet, Take 1 tablet by mouth Daily., Disp: 30 tablet, Rfl: 11    oxyCODONE-acetaminophen (PERCOCET) 5-325 MG per tablet, Take 1 tablet by mouth Every 4 (Four) Hours As Needed (Pain)., Disp: 30 tablet, Rfl: 0     Objective   Vital Signs:  BP (!) 172/118   Pulse 106   Ht 170.2 cm (67\")   Wt 98.4 kg (217 lb)   SpO2 98%   BMI 33.99 kg/m²   Estimated body mass index is 33.99 kg/m² as calculated " "from the following:    Height as of this encounter: 170.2 cm (67\").    Weight as of this encounter: 98.4 kg (217 lb).      Constitutional:       Appearance: Healthy appearance. Not in distress.   Neck:      Vascular: JVD elevated.   Pulmonary:      Effort: Pulmonary effort is normal.      Breath sounds: Normal breath sounds.   Cardiovascular:      Normal rate. Regular rhythm.      Murmurs: There is no murmur.      No gallop.  No click. No rub.   Edema:     Pretibial: 2+ edema of the left pretibial area and 1+ edema of the right pretibial area.  Abdominal:      General: There is no distension.      Palpations: Abdomen is soft.      Tenderness: There is no abdominal tenderness.   Skin:     General: Skin is warm and dry.   Neurological:      Mental Status: Alert and oriented to person, place and time.      Result Review :  The following data was reviewed by: Jeremiah Hall MD on 05/30/2023:       ECG 12 Lead    Date/Time: 5/30/2023 1:51 PM  Performed by: Jeremiah Hall MD  Authorized by: Jeremiah Hall MD   Comparison: compared with previous ECG from 2/12/2022  Comparison to previous ECG: PACs and PVCs are now present  Rhythm: sinus tachycardia  Ectopy: atrial premature contractions and infrequent PVCs  Rate: tachycardic  QRS axis: normal  Other findings comments: Downsloping ST depression with T wave inversions in the inferior lateral leads.    Clinical impression: abnormal EKG         Cardiac Catheterization/Vascular Study (02/12/2022 12:30)   LVEDP 31, LVEF 60%, no gradient across aortic valve on pullback, very torturous coronary arteries consistent with longstanding history of hypertensive disease, no significant epicardial coronary artery disease    Adult Transthoracic Echo Complete W/ Cont if Necessary Per Protocol (02/12/2022 12:43)   Left ventricular ejection fraction appears to be 56 - 60%. Left ventricular systolic function is normal.  The left ventricular cavity is moderately dilated. Left ventricular wall " thickness is consistent with severe concentric hypertrophy  Left ventricular diastolic function is consistent with (grade II w/high LAP) pseudonormalization.  Normal right ventricular cavity size and systolic function noted.  The left atrial cavity is mildly dilated.  Mild mitral valve regurgitation is present.  No evidence of pulmonary hypertension is present.  Incidental finding of gallstones in the gallbladder. Consider dedicated right upper quadrant ultrasound to further assess for significant gallbladder disease.     Assessment and Plan   Diagnoses and all orders for this visit:    1. Acute on chronic congestive heart failure, unspecified heart failure type (Primary)  -     Ambulatory Referral to Heart Failure Clinic  -     BNP; Future  -     Comprehensive Metabolic Panel; Future    2. Essential hypertension    3. Other hyperlipidemia  -     Lipid Panel; Future    Other orders  -     spironolactone (ALDACTONE) 25 MG tablet; Take 1 tablet by mouth Daily.  Dispense: 30 tablet; Refill: 11  -     ECG 12 Lead      Findings 2/2022 were consistent with HFpEF based on echo findings with significant LVEDP elevation.  She now presents with acute on chronic CHF likely related to decompensated HFpEF although further evaluation with echo is needed to clarify CHF type.  I am requesting her echo from [facility] for now.  In the meantime, we will work on optimizing CHF medications and her BP.  I did initially plan to start Jardiance, but the patient is somewhat reluctant about starting this medication as well as potentially adding several medications.  - Continue Lasix and encouraged consistent use of 40 mg daily  - Stop KCl  - Start spironolactone 25 mg daily  - Continue losartan 100 mg daily; could consider transition to Entresto in the future  - Advised home BP monitoring and to bring BP log to follow-up (patient does report that her BP is always quite elevated when she sees a new doctor)  - CMP, proBNP, and lipid panel  today  - Could consider Holter monitor if still having significant PVCs once better optimized (especially if LVEF is reduced).    Follow Up   Follow-up next Thursday in CHF clinic  Patient was given instructions and counseling regarding her condition or for health maintenance advice. Please see specific information pulled into the AVS if appropriate.       EMR Dragon/Transcription disclaimer: Much of this encounter note is an electronic transcription/translation of spoken language to printed text. The electronic translation of spoken language may permit erroneous, or at times, nonsensical words or phrases to be inadvertently transcribed; although I have reviewed the note for such errors, some may still exist.

## 2023-05-31 ENCOUNTER — TELEPHONE (OUTPATIENT)
Dept: CARDIOLOGY | Facility: CLINIC | Age: 63
End: 2023-05-31

## 2023-05-31 RX ORDER — SPIRONOLACTONE 25 MG/1
25 TABLET ORAL DAILY
Qty: 30 TABLET | Refills: 11 | Status: SHIPPED | OUTPATIENT
Start: 2023-05-31

## 2023-05-31 RX ORDER — SPIRONOLACTONE 25 MG/1
25 TABLET ORAL DAILY
Qty: 30 TABLET | Refills: 11 | Status: SHIPPED | OUTPATIENT
Start: 2023-05-31 | End: 2023-05-31 | Stop reason: SDUPTHER

## 2023-05-31 NOTE — TELEPHONE ENCOUNTER
----- Message from Jeremiah Hall MD sent at 5/30/2023  1:56 PM CDT -----  Regarding: Records  [facility]?  Also, can you see if we can get her echo from [facility] (she tells me that she had 1 done while she was admitted there).  A discharge summary from [facility] would also be good.  Thanks!

## 2023-05-31 NOTE — TELEPHONE ENCOUNTER
----- Message from Jeremiah Hall MD sent at 5/30/2023  9:23 PM CDT -----  Regarding: Labs  Please notify Ms. Covarrubias of her lab results. Her potassium is a little low, so I would like for her to continue taking KCl 20 mEq daily along with starting the spironolactone until her appointment next week. I will recheck her potassium levels then. Thank you.

## 2023-05-31 NOTE — TELEPHONE ENCOUNTER
The patient is called and notified of her lab result and the recommendation to start the potassium 20meq QD along with the Spironolactone 25mg QD.  She would like a 30 day RX for the Spironolactone sent to [facility] since [facility] the [facility] to [facility].  Jodie Reyes MA

## 2023-05-31 NOTE — TELEPHONE ENCOUNTER
Called [facility] records and requested the echo and D/C summary from the patient's recent stay with them.  They have the echo to send but the D/C summary is not yet available.  Also faxed [facility] On[facility] Joe, for pertinent records on why she has been referred to Dr. Hall @   [phone]  Jodie Reyes MA

## 2023-06-01 ENCOUNTER — TELEPHONE (OUTPATIENT)
Dept: CARDIOLOGY | Facility: CLINIC | Age: 63
End: 2023-06-01

## 2023-06-01 NOTE — TELEPHONE ENCOUNTER
Request for pertinent medical records is faxed to the [facility] to [facility] per [facility] to [phone].  Jodie Reyes MA

## 2023-06-08 ENCOUNTER — HOSPITAL ENCOUNTER (OUTPATIENT)
Dept: CARDIOLOGY | Facility: HOSPITAL | Age: 63
Discharge: HOME | End: 2023-06-08
Admitting: HOSPITALIST
Payer: OTHER GOVERNMENT

## 2023-06-08 VITALS
OXYGEN SATURATION: 93 % | BODY MASS INDEX: 36.48 KG/M2 | HEIGHT: 66 IN | WEIGHT: 227 LBS | HEART RATE: 114 BPM | DIASTOLIC BLOOD PRESSURE: 109 MMHG | SYSTOLIC BLOOD PRESSURE: 177 MMHG

## 2023-06-08 DIAGNOSIS — I10 ESSENTIAL HYPERTENSION: ICD-10-CM

## 2023-06-08 DIAGNOSIS — I50.43 ACUTE ON CHRONIC COMBINED SYSTOLIC AND DIASTOLIC CONGESTIVE HEART FAILURE: Primary | ICD-10-CM

## 2023-06-08 LAB
ANION GAP SERPL CALCULATED.3IONS-SCNC: 11 MMOL/L (ref 5–15)
BUN SERPL-MCNC: 26 MG/DL (ref 8–23)
BUN/CREAT SERPL: 27.4 (ref 7–25)
CALCIUM SPEC-SCNC: 8.7 MG/DL (ref 8.6–10.5)
CHLORIDE SERPL-SCNC: 100 MMOL/L (ref 98–107)
CO2 SERPL-SCNC: 28 MMOL/L (ref 22–29)
CREAT SERPL-MCNC: 0.95 MG/DL (ref 0.57–1)
EGFRCR SERPLBLD CKD-EPI 2021: 67 ML/MIN/1.73
GLUCOSE SERPL-MCNC: 208 MG/DL (ref 65–99)
POTASSIUM SERPL-SCNC: 3.6 MMOL/L (ref 3.5–5.2)
SODIUM SERPL-SCNC: 139 MMOL/L (ref 136–145)

## 2023-06-08 PROCEDURE — 80048 BASIC METABOLIC PNL TOTAL CA: CPT | Performed by: HOSPITALIST

## 2023-06-08 PROCEDURE — G0463 HOSPITAL OUTPT CLINIC VISIT: HCPCS | Performed by: HOSPITALIST

## 2023-06-08 RX ORDER — SACUBITRIL AND VALSARTAN 97; 103 MG/1; MG/1
1 TABLET, FILM COATED ORAL 2 TIMES DAILY
Qty: 60 TABLET | Refills: 11 | Status: SHIPPED | OUTPATIENT
Start: 2023-07-06

## 2023-06-08 RX ORDER — FUROSEMIDE 40 MG/1
40 TABLET ORAL DAILY
Start: 2023-06-08

## 2023-06-08 NOTE — PROGRESS NOTES
Reason For Visit:  CHF    Subjective        Yuli Covarrubias is a 63 y.o. female with the below pertinent PMH who presents for follow-up of CHF.    I was able to obtain records from [facility] and reviewed them today.  It appears she presented to the ED 4/30 with shortness of breath and was noted to be tachycardic with hypertension.  Notes indicate that she was not taking her HCTZ at the time of admission.  Admission labs were notable for mildly elevated bilirubin at 1.69 with normal AST and ALT.  proBNP was elevated at 13,500 CTA was negative for PE but reportedly showed signs of heart failure.  TSH mildly elevated at 5.27.  Urine drug screen was negative.  Echo obtained during admission read as LVH with markedly enlarged left atrial cavity, enlarged right atrial and right ventricular cavities, akinetic septal wall/hypokinetic inferoposterior wall with a EF 20-25%, mild to moderate MR and TR.    [facility].  They indicate patient had been seen in the [facility] ED in February with hypertensive and chest discomfort where she reportedly was admitted with recommendation for inpatient stress and cardiology evaluation but left AMA.  Note indicates she has been on carvedilol in the past but at some point had stopped taking it.  Notes indicate a history of not taking medications consistently as prescribed.  She refused HIV and HCV testing.  Note from 5/23 indicates she was offered Jardiance prescription, but unclear if this was started.    Today, the patient reports that she has felt a little better since her last visit.  It took a little while for her to get spironolactone, but she states that she started it 4 or 5 days ago; she did have some confusion about her medicines and ended up stopping the Lasix when she started the spironolactone.  She did continue taking the losartan.  She has not been checking her BP or weights at home.  She feels like her  "breathing is overall stable although did note that she was able to walk into clinic a little better today than she was last week.  She denies any chest pressure, palpitations, lightheadedness.  She has stable orthopnea.    ROS: Pertinent findings are included above.    Prior history:  Patient was initially seen by me 5/30 as a referral for CHF.  She reported abdominal and lower extremity swelling starting in March associated with fatigue and dyspnea.  She had been admitted to [facility] and reportedly transition from thiazide diuretic to Lasix.  At the time of her visit with me, she reported alternating Lasix 40 mg and 20 mg depending on how she feels the more robust urinary response to the higher dose.  Lower extremity swelling is improving but still present associated with orthopnea and some abdominal distention.  She was asked to take Lasix 40 mg daily, started on spironolactone 25 mg daily, continued on losartan 100 mg daily.  She was noted to have PVCs, so discussed possible Holter monitor in the future.    Pertinent PMH  - HFrEF (LVEF reportedly 20-25%)  - Hypertension  - Hyperlipidemia    Pertinent past medical, surgical, family, and social history were reviewed.      Current Outpatient Medications:     cephalexin (KEFLEX) 500 MG capsule, Take 1 capsule by mouth 4 (Four) Times a Day., Disp: , Rfl:     furosemide (LASIX) 40 MG tablet, Take 1 tablet by mouth Daily., Disp: , Rfl:     losartan (COZAAR) 100 MG tablet, Take 1 tablet by mouth Daily., Disp: 30 tablet, Rfl: 11    spironolactone (ALDACTONE) 25 MG tablet, Take 1 tablet by mouth Daily., Disp: 30 tablet, Rfl: 11     Objective   Vital Signs:  BP (!) 177/109   Pulse 114   Ht 167.6 cm (66\")   Wt 103 kg (227 lb)   SpO2 93%   BMI 36.64 kg/m²   Estimated body mass index is 36.64 kg/m² as calculated from the following:    Height as of this encounter: 167.6 cm (66\").    Weight as of this encounter: 103 kg (227 lb).      Constitutional:       Appearance: " Healthy appearance. Not in distress.   Neck:      Vascular: JVD elevated.   Pulmonary:      Effort: Pulmonary effort is normal.      Breath sounds: Normal breath sounds.   Cardiovascular:      Tachycardia present. Regular rhythm.      Murmurs: There is no murmur.      No gallop.  No click. No rub.   Edema:     Comments: 1+ bilateral pretibial and ankle edema with somewhat tight legs.  Abdominal:      General: There is no distension.      Palpations: Abdomen is soft.      Tenderness: There is no abdominal tenderness.   Skin:     General: Skin is warm and dry.   Neurological:      Mental Status: Alert and oriented to person, place and time.      Result Review :  The following data was reviewed by: Jeremiah Hall MD on 06/08/2023:  BMP   BMP          5/30/2023    14:32 6/8/2023    08:50   BMP   BUN 25  26    Creatinine 0.98  0.95    Sodium 141  139    Potassium 3.4  3.6    Chloride 101  100    CO2 28.0  28.0    Calcium 9.4  8.7                Assessment and Plan   Diagnoses and all orders for this visit:    1. Acute on chronic congestive heart failure, unspecified heart failure type (Primary)  2. Essential hypertension  - Etiology: Likely nonischemic given cath 1 year ago showing no CAD.  Suspect due to hypertensive disease.  Will try to obtain outside echo for review as findings sound consistent with possible Takotsubo cardiomyopathy or ischemia; may need to consider an updated ischemic evaluation.  - LVEF: 20-25%  - KCCQ: date 6/8/2023, score 33  - BB: We will hold off on starting today given decompensated state  - ACEi/ARB/ARNi: Switching from losartan 100 mg daily to Entresto 97/103 mg twice daily to start tomorrow  - SGLT2i: Starting Jardiance 10 mg daily ([facility] states that [facility] but had not yet started it)  - MRA: Continue spironolactone 25 mg daily  - Volume: Hypervolemic  - Diuretics: Resume Lasix 40 mg daily  - ICD/CRT: We will reassess LV function after optimizing GDMT.  Offered  LifeVest today, which patient declined.  - IV Iron: Consider iron studies with CBC at follow-up  - Majority of time during today's visit spent counseling patient on diagnosis and role of medications as well as home monitoring.       I spent 50 minutes caring for Yuli on this date of service. This time includes time spent by me in the following activities:preparing for the visit, reviewing tests, performing a medically appropriate examination and/or evaluation , counseling and educating the patient/family/caregiver, ordering medications, tests, or procedures, documenting information in the medical record, and care coordination    Follow Up   Return in about 8 days (around 6/16/2023).  Patient was given instructions and counseling regarding her condition or for health maintenance advice. Please see specific information pulled into the AVS if appropriate.       EMR Dragon/Transcription disclaimer: Much of this encounter note is an electronic transcription/translation of spoken language to printed text. The electronic translation of spoken language may permit erroneous, or at times, nonsensical words or phrases to be inadvertently transcribed; although I have reviewed the note for such errors, some may still exist.

## 2023-06-16 ENCOUNTER — HOSPITAL ENCOUNTER (OUTPATIENT)
Dept: CARDIOLOGY | Facility: HOSPITAL | Age: 63
Discharge: HOME | End: 2023-06-16
Payer: OTHER GOVERNMENT

## 2023-06-16 VITALS
SYSTOLIC BLOOD PRESSURE: 157 MMHG | OXYGEN SATURATION: 95 % | HEART RATE: 86 BPM | DIASTOLIC BLOOD PRESSURE: 110 MMHG | BODY MASS INDEX: 34.38 KG/M2 | WEIGHT: 213 LBS

## 2023-06-16 DIAGNOSIS — I10 ESSENTIAL HYPERTENSION: ICD-10-CM

## 2023-06-16 DIAGNOSIS — I50.22 CHRONIC HFREF (HEART FAILURE WITH REDUCED EJECTION FRACTION): Primary | ICD-10-CM

## 2023-06-16 LAB
ANION GAP SERPL CALCULATED.3IONS-SCNC: 12 MMOL/L (ref 5–15)
BUN SERPL-MCNC: 21 MG/DL (ref 8–23)
BUN/CREAT SERPL: 22.6 (ref 7–25)
CALCIUM SPEC-SCNC: 9.2 MG/DL (ref 8.6–10.5)
CHLORIDE SERPL-SCNC: 102 MMOL/L (ref 98–107)
CO2 SERPL-SCNC: 25 MMOL/L (ref 22–29)
CREAT SERPL-MCNC: 0.93 MG/DL (ref 0.57–1)
EGFRCR SERPLBLD CKD-EPI 2021: 68.8 ML/MIN/1.73
GLUCOSE SERPL-MCNC: 173 MG/DL (ref 65–99)
POTASSIUM SERPL-SCNC: 4 MMOL/L (ref 3.5–5.2)
SODIUM SERPL-SCNC: 139 MMOL/L (ref 136–145)

## 2023-06-16 PROCEDURE — G0463 HOSPITAL OUTPT CLINIC VISIT: HCPCS | Performed by: HOSPITALIST

## 2023-06-16 PROCEDURE — 80048 BASIC METABOLIC PNL TOTAL CA: CPT

## 2023-06-16 NOTE — PROGRESS NOTES
[facility]  Heart Failure Clinic  Subjective:     Encounter Date:06/16/2023      Patient ID: Yuli Covarrubias is a 63 y.o. female     Chief Complaint:  History of Present Illness  Yuli Covarrubias is a 63 y.o. female with the below pertinent PMH who presents for follow-up of congestive heart failure.    Patient was last seen 6/8/2023.  At that time she reported stopping her Lasix and starting the spironolactone.  She did continue to take losartan.  She has not been checking her blood pressure or weights at home consistently.  She was noted to be grossly hypertensive in the clinic.  Given that she was transition from losartan to Entresto.  She was also started on Jardiance 10 mg daily.    Since that appointment she said that she had a large amount of volume diuresis as well as a fall.  She said the fall was due to her trying to rush to get to the bathroom.  She says she was not dizzy or lightheaded.  She states that she stopped taking her Lasix and potassium replacement.  She initially refused to have labs drawn in the clinic today but agreed near the end of the appointment.  She continues to take Jardiance, Entresto, and spironolactone.  [facility] 25 mg Jardiance that she has been cutting them in half.  She declines having a sleep study as she says she would not participate in the results of this study  She continues to decline a LifeVest at this time.  She remains swollen in her lower extremities but she says that is better.  She says that her dyspnea on exertion is somewhat improved.  She declines having any chest pain.    ROS: Pertinent findings as noted above    Pertinent PMH  - HFrEF (LVEF reportedly 20-25%)  - Hypertension  - Hyperlipidemia    The following portions of the patient's history were reviewed and updated as appropriate: allergies, current medications, past medical history, past social history, past and problem list.    No Known Allergies    Current  Outpatient Medications:     empagliflozin (Jardiance) 10 MG tablet tablet, Take 1 tablet by mouth Daily., Disp: 30 tablet, Rfl: 11    furosemide (LASIX) 40 MG tablet, Take 1 tablet by mouth Daily., Disp: , Rfl:     metoprolol tartrate (LOPRESSOR) 25 MG tablet, Take 0.5 tablets by mouth 2 (Two) Times a Day., Disp: 60 tablet, Rfl: 11    sacubitril-valsartan (ENTRESTO)  MG tablet, Take 1 tablet by mouth 2 (Two) Times a Day., Disp: 60 tablet, Rfl: 0    [START ON 7/6/2023] sacubitril-valsartan (Entresto)  MG tablet, Take 1 tablet by mouth 2 (Two) Times a Day., Disp: 60 tablet, Rfl: 11    spironolactone (ALDACTONE) 25 MG tablet, Take 1 tablet by mouth Daily., Disp: 30 tablet, Rfl: 11    Social History     Socioeconomic History    Marital status:    Tobacco Use    Smoking status: Never   Vaping Use    Vaping Use: Never used   Substance and Sexual Activity    Alcohol use: Never    Drug use: Never                Objective:     Constitutional:       Appearance: Healthy appearance. Not in distress.   Pulmonary:      Effort: Pulmonary effort is normal.      Breath sounds: Normal breath sounds.   Cardiovascular:      PMI at left midclavicular line. Normal rate. Regular rhythm.      Murmurs: There is no murmur.      No gallop.  No click. No rub.   Edema:     Peripheral edema present.     Ankle: bilateral 1+ pitting edema of the ankle.     Feet: bilateral 2+ pitting edema of the feet.  Abdominal:      General: Bowel sounds are normal.   Musculoskeletal: Normal range of motion.      Cervical back: Normal range of motion and neck supple. Skin:     General: Skin is warm.   Neurological:      Mental Status: Alert and oriented to person, place and time.         Procedures  BP (!) 157/110 (BP Location: Left arm, Patient Position: Sitting)   Pulse 86   Wt 96.6 kg (213 lb)   SpO2 95%   BMI 34.38 kg/m²       06/16/23  1009   Weight: 96.6 kg (213 lb)      Lab Review:   I have reviewed      BMP          5/30/2023     14:32 6/8/2023    08:50   BMP   BUN 25  26    Creatinine 0.98  0.95    Sodium 141  139    Potassium 3.4  3.6    Chloride 101  100    CO2 28.0  28.0    Calcium 9.4  8.7       Lab Results   Component Value Date    CHOL 109 05/30/2023    TRIG 80 05/30/2023    HDL 38 (L) 05/30/2023    LDL 55 05/30/2023      Results for orders placed during the hospital encounter of 02/11/22    Adult Transthoracic Echo Complete W/ Cont if Necessary Per Protocol    Interpretation Summary  · Left ventricular ejection fraction appears to be 56 - 60%. Left ventricular systolic function is normal.  · The left ventricular cavity is moderately dilated. Left ventricular wall thickness is consistent with severe concentric hypertrophy  · Left ventricular diastolic function is consistent with (grade II w/high LAP) pseudonormalization.  · Normal right ventricular cavity size and systolic function noted.  · The left atrial cavity is mildly dilated.  · Mild mitral valve regurgitation is present.  · No evidence of pulmonary hypertension is present.  · Incidental finding of gallstones in the gallbladder. Consider dedicated right upper quadrant ultrasound to further assess for significant gallbladder disease.       Assessment and Plan   Diagnoses and all orders for this visit:    1. Chronic HFrEF (heart failure with reduced ejection fraction) (Primary)  2. Essential hypertension  - Etiology: Likely nonischemic, may be due to hypertensive disease  - LVEF: 20 to 25%  - NYHA Class: II  - KCCQ: date 6/8/2023, score 33  - BB: We will initiate metoprolol tartrate 12.5 mg twice daily  - ACEi/ARB/ARNi: Entresto 97/103 mg twice daily  - SGLT2i:  Jardiance 10 mg daily  - MRA: Spironolactone 25 mg daily  - Diuretics: Patient is currently holding her Lasix and potassium replacement  - Electrolytes: We will check labs today  - ICD/CRT: Patient has declined LifeVest at this time  - IV Iron: We will check iron profile at next appointment    Patient is down 14 pounds  since her previous appointment, however she is still volume up.  She still has symptoms including dyspnea as well as lower extremity swelling.  Given this I will continue her current therapy since she is diuresing well with it.  She is still hypertensive in the clinic.  I have started her on a low-dose metoprolol given that she is closer to euvolemia.  I will have her hold her Lasix and her potassium replacement until I get her lab work, she initially refused lab work but then near the end of the appointment she agreed to have it completed.  I will contact her regarding her lab work with further instructions.  I will see her back in 2 weeks.    She is wanting an order that says that she is allowed to see the chiropractor for her swollen lower extremities.  I told her that there is no contraindication for her to see a chiropractor for this and that if she wants to that she can proceed with this.  I told her I am unable to place an order for this, however she has my blessing to pursue this.      Other orders  -     metoprolol tartrate (LOPRESSOR) 25 MG tablet; Take 0.5 tablets by mouth 2 (Two) Times a Day.  Dispense: 60 tablet; Refill: 11               Follow Up   No follow-ups on file.  Patient was given instructions and counseling regarding her condition or for health maintenance advice. Please see specific information pulled into the AVS if appropriate.       EMR Dragon/Transcription disclaimer: Much of this encounter note is an electronic transcription/translation of spoken language to printed text. The electronic translation of spoken language may permit erroneous, or at times, nonsensical words or phrases to be inadvertently transcribed; although I have reviewed the note for such errors, some may still exist.

## 2023-08-04 ENCOUNTER — HOSPITAL ENCOUNTER (OUTPATIENT)
Dept: CARDIOLOGY | Facility: HOSPITAL | Age: 63
Discharge: HOME | End: 2023-08-04
Payer: OTHER GOVERNMENT

## 2023-08-04 VITALS
DIASTOLIC BLOOD PRESSURE: 111 MMHG | SYSTOLIC BLOOD PRESSURE: 153 MMHG | WEIGHT: 187 LBS | HEART RATE: 115 BPM | BODY MASS INDEX: 30.18 KG/M2 | OXYGEN SATURATION: 94 %

## 2023-08-04 DIAGNOSIS — I50.22 CHRONIC HFREF (HEART FAILURE WITH REDUCED EJECTION FRACTION): Primary | ICD-10-CM

## 2023-08-04 DIAGNOSIS — I49.3 PVC'S (PREMATURE VENTRICULAR CONTRACTIONS): ICD-10-CM

## 2023-08-04 DIAGNOSIS — I10 ESSENTIAL HYPERTENSION: ICD-10-CM

## 2023-08-04 LAB
ANION GAP SERPL CALCULATED.3IONS-SCNC: 10 MMOL/L (ref 5–15)
BUN SERPL-MCNC: 20 MG/DL (ref 8–23)
BUN/CREAT SERPL: 20.6 (ref 7–25)
CALCIUM SPEC-SCNC: 9.1 MG/DL (ref 8.6–10.5)
CHLORIDE SERPL-SCNC: 100 MMOL/L (ref 98–107)
CO2 SERPL-SCNC: 29 MMOL/L (ref 22–29)
CREAT SERPL-MCNC: 0.97 MG/DL (ref 0.57–1)
EGFRCR SERPLBLD CKD-EPI 2021: 65.4 ML/MIN/1.73
GLUCOSE SERPL-MCNC: 118 MG/DL (ref 65–99)
POTASSIUM SERPL-SCNC: 3.6 MMOL/L (ref 3.5–5.2)
SODIUM SERPL-SCNC: 139 MMOL/L (ref 136–145)

## 2023-08-04 PROCEDURE — 80048 BASIC METABOLIC PNL TOTAL CA: CPT

## 2023-08-04 PROCEDURE — G0463 HOSPITAL OUTPT CLINIC VISIT: HCPCS | Performed by: HOSPITALIST

## 2023-08-04 RX ORDER — CARVEDILOL 3.12 MG/1
3.12 TABLET ORAL 2 TIMES DAILY
Qty: 60 TABLET | Refills: 1 | Status: SHIPPED | OUTPATIENT
Start: 2023-08-04 | End: 2023-08-04 | Stop reason: SDUPTHER

## 2023-08-04 RX ORDER — CARVEDILOL 3.12 MG/1
3.12 TABLET ORAL 2 TIMES DAILY
Qty: 180 TABLET | Refills: 3 | Status: SHIPPED | OUTPATIENT
Start: 2023-08-04

## 2023-08-28 RX ORDER — CARVEDILOL 3.12 MG/1
3.12 TABLET ORAL 2 TIMES DAILY
Qty: 180 TABLET | Refills: 3 | Status: SHIPPED | OUTPATIENT
Start: 2023-08-28

## 2023-09-11 ENCOUNTER — HOSPITAL ENCOUNTER (OUTPATIENT)
Dept: CARDIOLOGY | Facility: HOSPITAL | Age: 63
Discharge: HOME | End: 2023-09-11
Payer: OTHER GOVERNMENT

## 2023-09-11 VITALS
DIASTOLIC BLOOD PRESSURE: 86 MMHG | WEIGHT: 177.8 LBS | BODY MASS INDEX: 28.7 KG/M2 | OXYGEN SATURATION: 90 % | HEART RATE: 105 BPM | SYSTOLIC BLOOD PRESSURE: 133 MMHG

## 2023-09-11 DIAGNOSIS — I49.3 PVC'S (PREMATURE VENTRICULAR CONTRACTIONS): ICD-10-CM

## 2023-09-11 DIAGNOSIS — I50.22 CHRONIC HFREF (HEART FAILURE WITH REDUCED EJECTION FRACTION): Primary | ICD-10-CM

## 2023-09-11 DIAGNOSIS — I10 ESSENTIAL HYPERTENSION: ICD-10-CM

## 2023-09-11 LAB
ANION GAP SERPL CALCULATED.3IONS-SCNC: 9 MMOL/L (ref 5–15)
BUN SERPL-MCNC: 21 MG/DL (ref 8–23)
BUN/CREAT SERPL: 21 (ref 7–25)
CALCIUM SPEC-SCNC: 9.2 MG/DL (ref 8.6–10.5)
CHLORIDE SERPL-SCNC: 101 MMOL/L (ref 98–107)
CO2 SERPL-SCNC: 29 MMOL/L (ref 22–29)
CREAT SERPL-MCNC: 1 MG/DL (ref 0.57–1)
EGFRCR SERPLBLD CKD-EPI 2021: 63 ML/MIN/1.73
GLUCOSE SERPL-MCNC: 133 MG/DL (ref 65–99)
POTASSIUM SERPL-SCNC: 3.6 MMOL/L (ref 3.5–5.2)
SODIUM SERPL-SCNC: 139 MMOL/L (ref 136–145)

## 2023-09-11 PROCEDURE — 80048 BASIC METABOLIC PNL TOTAL CA: CPT

## 2023-09-11 PROCEDURE — G0463 HOSPITAL OUTPT CLINIC VISIT: HCPCS | Performed by: HOSPITALIST

## 2023-09-11 NOTE — PROGRESS NOTES
[facility]  Heart Failure Clinic  Subjective:     Encounter Date:09/11/2023      Patient ID: Yuli Covarrubias is a 63 y.o. female     Chief Complaint:  History of Present Illness  Yuli Covarrubias is a 63 y.o. female with the below pertinent PMH who presents for follow-up of CHF.    Patient was seen 8/4/2023.  At that time from a heart rate standpoint she was doing a lot better.  She had significant weight loss.  She did stop taking her beta-blocker therapy because it was causing her constant headache.  She was complaining of fluttering in her chest after stopping the beta-blocker therapy.  She was started on carvedilol as an alternative to this.    Since that appointment she has been doing well.  She is continue to have sustained weight loss.  She is down 10 more pounds in the previous appointment.  Starting carvedilol her blood pressure is improved.  She denies having any heart failure symptoms.  She denies any anginal symptoms at this time.      ROS: Pertinent findings as noted above    Pertinent PMH  - HFrEF (LVEF reportedly 20-25%)  - Hypertension  - Hyperlipidemia    The following portions of the patient's history were reviewed and updated as appropriate: allergies, current medications, past medical history, past social history, past and problem list.    No Known Allergies    Current Outpatient Medications:     carvedilol (COREG) 3.125 MG tablet, Take 1 tablet by mouth 2 (Two) Times a Day., Disp: 180 tablet, Rfl: 3    chlorthalidone (HYGROTON) 25 MG tablet, Take 1 tablet by mouth Daily., Disp: 30 tablet, Rfl: 11    empagliflozin (Jardiance) 10 MG tablet tablet, Take 1 tablet by mouth Daily., Disp: 30 tablet, Rfl: 11    sacubitril-valsartan (Entresto)  MG tablet, Take 1 tablet by mouth 2 (Two) Times a Day., Disp: 60 tablet, Rfl: 11    spironolactone (ALDACTONE) 50 MG tablet, Take 1 tablet by mouth Daily., Disp: 30 tablet, Rfl: 11    Social History     Socioeconomic History    Marital  status:    Tobacco Use    Smoking status: Never    Smokeless tobacco: Never   Vaping Use    Vaping Use: Never used   Substance and Sexual Activity    Alcohol use: Never    Drug use: Never                Objective:     Constitutional:       Appearance: Healthy appearance. Not in distress.   Pulmonary:      Effort: Pulmonary effort is normal.      Breath sounds: Normal breath sounds.   Cardiovascular:      PMI at left midclavicular line. Normal rate. Regular rhythm.      Murmurs: There is no murmur.      No gallop.  No click. No rub.   Edema:     Peripheral edema present.     Feet: bilateral trace edema of the feet.  Abdominal:      General: Bowel sounds are normal.   Musculoskeletal: Normal range of motion.      Cervical back: Normal range of motion and neck supple. Skin:     General: Skin is warm.   Neurological:      Mental Status: Alert and oriented to person, place and time.         Procedures  /86 (BP Location: Left arm, Patient Position: Sitting)   Pulse 105   Wt 80.6 kg (177 lb 12.8 oz)   SpO2 90%   BMI 28.70 kg/m²       09/11/23  1301   Weight: 80.6 kg (177 lb 12.8 oz)      Lab Review:   I have reviewed      BMP          7/17/2023    11:34 8/4/2023    08:04 9/11/2023    12:52   BMP   BUN 19  20  21    Creatinine 0.80  0.97  1.00    Sodium 141  139  139    Potassium 3.7  3.6  3.6    Chloride 105  100  101    CO2 23.0  29.0  29.0    Calcium 9.1  9.1  9.2       Lab Results   Component Value Date    CHOL 109 05/30/2023    TRIG 80 05/30/2023    HDL 38 (L) 05/30/2023    LDL 55 05/30/2023      Results for orders placed during the hospital encounter of 02/11/22    Adult Transthoracic Echo Complete W/ Cont if Necessary Per Protocol    Interpretation Summary  · Left ventricular ejection fraction appears to be 56 - 60%. Left ventricular systolic function is normal.  · The left ventricular cavity is moderately dilated. Left ventricular wall thickness is consistent with severe concentric hypertrophy  ·  Left ventricular diastolic function is consistent with (grade II w/high LAP) pseudonormalization.  · Normal right ventricular cavity size and systolic function noted.  · The left atrial cavity is mildly dilated.  · Mild mitral valve regurgitation is present.  · No evidence of pulmonary hypertension is present.  · Incidental finding of gallstones in the gallbladder. Consider dedicated right upper quadrant ultrasound to further assess for significant gallbladder disease.       Assessment and Plan   Diagnoses and all orders for this visit:    1. Chronic HFrEF (heart failure with reduced ejection fraction) (Primary)  2. Essential hypertension  3. PVC's (premature ventricular contractions)  - Etiology: Likely nonischemic, most likely due to hypertensive disease.  Ischemic evaluation ordered at this appointment  - LVEF: 20-25%  - NYHA Class: II  - KCCQ: date 6/8/2023, score 33  - BB: Starting carvedilol 3.125 mg twice daily  - ACEi/ARB/ARNi: Entresto 97/103 mg twice daily  - SGLT2i: Jardiance 10 mg daily  - MRA: Spironolactone 50 mg daily  - Diuretics: Chlorthalidone 25 mg daily  - Electrolytes: Stable lab work today  - ICD/CRT: Patient has declined LifeVest.  Echo pending.  - IV Iron: Patient qualifies for IV iron, will focus on improving volume status and consider referral for IV iron    Overall the patient appears euvolemic and tolerating her current regimen well.  She is still tachycardic, however the patient does not want to increase any medications at this time.  I did get her to agree to an echocardiogram as well as a nuclear stress test giving her low EF and potential ischemic component.  These will both be done before next appointment.  I will leave her medication regimen the same at this time.  I will see her back in 4 to 6 weeks.  At that time depending on echo results could consider referral for AICD placement.  Pending nuclear stress test results we will consider referral to interventional cardiology.                Follow Up   No follow-ups on file.  Patient was given instructions and counseling regarding her condition or for health maintenance advice. Please see specific information pulled into the AVS if appropriate.       EMR Dragon/Transcription disclaimer: Much of this encounter note is an electronic transcription/translation of spoken language to printed text. The electronic translation of spoken language may permit erroneous, or at times, nonsensical words or phrases to be inadvertently transcribed; although I have reviewed the note for such errors, some may still exist.

## 2023-10-16 ENCOUNTER — TELEPHONE (OUTPATIENT)
Dept: CARDIOLOGY | Facility: CLINIC | Age: 63
End: 2023-10-16
Payer: OTHER GOVERNMENT

## 2023-10-16 NOTE — TELEPHONE ENCOUNTER
Left voicemail for patient informing her she can contact Central Scheduling through [facility] at [phone] to reschedule.

## 2023-10-16 NOTE — TELEPHONE ENCOUNTER
"  Caller: Yuli Covarrubias \"Hue\"    Relationship: Self    Best call back number:  [phone]        PATIENT IS REQUESTING TO RESCHEDULE HER STRESS TEST, ECHO  "

## 2023-12-06 ENCOUNTER — HOSPITAL ENCOUNTER (OUTPATIENT)
Dept: CARDIOLOGY | Facility: HOSPITAL | Age: 63
Discharge: HOME | End: 2023-12-06
Payer: OTHER GOVERNMENT

## 2023-12-06 VITALS
BODY MASS INDEX: 28.45 KG/M2 | WEIGHT: 177 LBS | DIASTOLIC BLOOD PRESSURE: 86 MMHG | SYSTOLIC BLOOD PRESSURE: 133 MMHG | HEIGHT: 66 IN

## 2023-12-06 VITALS
HEIGHT: 66 IN | WEIGHT: 177 LBS | HEART RATE: 105 BPM | DIASTOLIC BLOOD PRESSURE: 112 MMHG | SYSTOLIC BLOOD PRESSURE: 170 MMHG | BODY MASS INDEX: 28.45 KG/M2

## 2023-12-06 DIAGNOSIS — I50.22 CHRONIC HFREF (HEART FAILURE WITH REDUCED EJECTION FRACTION): ICD-10-CM

## 2023-12-06 PROCEDURE — 0 TECHNETIUM TETROFOSMIN KIT

## 2023-12-06 PROCEDURE — 93306 TTE W/DOPPLER COMPLETE: CPT

## 2023-12-06 PROCEDURE — 25010000002 REGADENOSON 0.4 MG/5ML SOLUTION: Performed by: HOSPITALIST

## 2023-12-06 PROCEDURE — 93017 CV STRESS TEST TRACING ONLY: CPT

## 2023-12-06 PROCEDURE — 78452 HT MUSCLE IMAGE SPECT MULT: CPT

## 2023-12-06 PROCEDURE — A9502 TC99M TETROFOSMIN: HCPCS

## 2023-12-06 RX ORDER — REGADENOSON 0.08 MG/ML
0.4 INJECTION, SOLUTION INTRAVENOUS ONCE
Status: COMPLETED | OUTPATIENT
Start: 2023-12-06 | End: 2023-12-06

## 2023-12-06 RX ADMIN — TETROFOSMIN 1 DOSE: 1.38 INJECTION, POWDER, LYOPHILIZED, FOR SOLUTION INTRAVENOUS at 11:00

## 2023-12-06 RX ADMIN — REGADENOSON 0.4 MG: 0.08 INJECTION, SOLUTION INTRAVENOUS at 11:04

## 2023-12-06 RX ADMIN — TETROFOSMIN 1 DOSE: 1.38 INJECTION, POWDER, LYOPHILIZED, FOR SOLUTION INTRAVENOUS at 08:35

## 2023-12-08 LAB
ASCENDING AORTA: 4.1 CM
BH CV ECHO MEAS - AO MAX PG: 4.7 MMHG
BH CV ECHO MEAS - AO MEAN PG: 3 MMHG
BH CV ECHO MEAS - AO ROOT DIAM: 3.6 CM
BH CV ECHO MEAS - AO V2 MAX: 108 CM/SEC
BH CV ECHO MEAS - AO V2 VTI: 21.5 CM
BH CV ECHO MEAS - AVA(I,D): 2.9 CM2
BH CV ECHO MEAS - EDV(CUBED): 154.8 ML
BH CV ECHO MEAS - EDV(MOD-SP2): 127.3 ML
BH CV ECHO MEAS - EDV(MOD-SP4): 100.1 ML
BH CV ECHO MEAS - EF(MOD-SP2): 38.6 %
BH CV ECHO MEAS - EF(MOD-SP4): 40.3 %
BH CV ECHO MEAS - ESV(CUBED): 80.1 ML
BH CV ECHO MEAS - ESV(MOD-SP2): 78.2 ML
BH CV ECHO MEAS - ESV(MOD-SP4): 59.8 ML
BH CV ECHO MEAS - FS: 19.7 %
BH CV ECHO MEAS - IVS/LVPW: 0.95 CM
BH CV ECHO MEAS - IVSD: 1.28 CM
BH CV ECHO MEAS - LA DIMENSION: 4.5 CM
BH CV ECHO MEAS - LAT PEAK E' VEL: 8.2 CM/SEC
BH CV ECHO MEAS - LV DIASTOLIC VOL/BSA (35-75): 52.7 CM2
BH CV ECHO MEAS - LV MASS(C)D: 295.7 GRAMS
BH CV ECHO MEAS - LV MAX PG: 2.6 MMHG
BH CV ECHO MEAS - LV MEAN PG: 1 MMHG
BH CV ECHO MEAS - LV SYSTOLIC VOL/BSA (12-30): 31.5 CM2
BH CV ECHO MEAS - LV V1 MAX: 80.5 CM/SEC
BH CV ECHO MEAS - LV V1 VTI: 16.5 CM
BH CV ECHO MEAS - LVIDD: 5.4 CM
BH CV ECHO MEAS - LVIDS: 4.3 CM
BH CV ECHO MEAS - LVOT AREA: 3.8 CM2
BH CV ECHO MEAS - LVOT DIAM: 2.2 CM
BH CV ECHO MEAS - LVPWD: 1.34 CM
BH CV ECHO MEAS - MED PEAK E' VEL: 6.4 CM/SEC
BH CV ECHO MEAS - MV A MAX VEL: 104 CM/SEC
BH CV ECHO MEAS - MV E MAX VEL: 45.6 CM/SEC
BH CV ECHO MEAS - MV E/A: 0.44
BH CV ECHO MEAS - MV MAX PG: 7.2 MMHG
BH CV ECHO MEAS - MV MEAN PG: 3 MMHG
BH CV ECHO MEAS - MV V2 VTI: 17.3 CM
BH CV ECHO MEAS - MVA(VTI): 3.6 CM2
BH CV ECHO MEAS - PA V2 MAX: 106 CM/SEC
BH CV ECHO MEAS - PI END-D VEL: 175 CM/SEC
BH CV ECHO MEAS - PULM A REVS DUR: 0.11 SEC
BH CV ECHO MEAS - PULM A REVS VEL: 46.5 CM/SEC
BH CV ECHO MEAS - RAP SYSTOLE: 15 MMHG
BH CV ECHO MEAS - RV MAX PG: 1.85 MMHG
BH CV ECHO MEAS - RV V1 MAX: 68 CM/SEC
BH CV ECHO MEAS - RV V1 VTI: 10.5 CM
BH CV ECHO MEAS - RVDD: 4.6 CM
BH CV ECHO MEAS - SI(MOD-BP): 25 ML/M2
BH CV ECHO MEAS - SI(MOD-SP2): 25.8 ML/M2
BH CV ECHO MEAS - SI(MOD-SP4): 21.3 ML/M2
BH CV ECHO MEAS - SV(LVOT): 62.7 ML
BH CV ECHO MEAS - SV(MOD-SP2): 49.1 ML
BH CV ECHO MEAS - SV(MOD-SP4): 40.4 ML
BH CV ECHO MEAS - TAPSE (>1.6): 1.82 CM
BH CV ECHO MEASUREMENTS AVERAGE E/E' RATIO: 6.25
BH CV REST NUCLEAR ISOTOPE DOSE: 11.2 MCI
BH CV STRESS BP STAGE 1: NORMAL
BH CV STRESS COMMENTS STAGE 1: NORMAL
BH CV STRESS DOSE REGADENOSON STAGE 1: 0.4
BH CV STRESS DURATION MIN STAGE 1: 0
BH CV STRESS DURATION SEC STAGE 1: 10
BH CV STRESS HR STAGE 1: 100
BH CV STRESS NUCLEAR ISOTOPE DOSE: 35.2 MCI
BH CV STRESS PROTOCOL 1: NORMAL
BH CV STRESS RECOVERY BP: NORMAL MMHG
BH CV STRESS RECOVERY HR: 108 BPM
BH CV STRESS STAGE 1: 1
BH CV XLRA - RV BASE: 4.2 CM
BH CV XLRA - TDI S': 10.7 CM/SEC
LEFT ATRIUM VOLUME INDEX: 42.4 ML/M2
LEFT ATRIUM VOLUME: 96 ML
LV EF NUC BP: 45 %
MAXIMAL PREDICTED HEART RATE: 156 BPM
PERCENT MAX PREDICTED HR: 64.1 %
STRESS BASELINE BP: NORMAL MMHG
STRESS BASELINE HR: 104 BPM
STRESS PERCENT HR: 75 %
STRESS POST EXERCISE DUR SEC: 10 SEC
STRESS POST PEAK BP: NORMAL MMHG
STRESS POST PEAK HR: 100 BPM
STRESS TARGET HR: 133 BPM

## 2023-12-11 ENCOUNTER — HOSPITAL ENCOUNTER (OUTPATIENT)
Dept: CARDIOLOGY | Facility: HOSPITAL | Age: 63
Discharge: HOME | End: 2023-12-11
Admitting: HOSPITALIST
Payer: OTHER GOVERNMENT

## 2023-12-11 VITALS
HEART RATE: 99 BPM | SYSTOLIC BLOOD PRESSURE: 145 MMHG | OXYGEN SATURATION: 90 % | WEIGHT: 178.2 LBS | BODY MASS INDEX: 28.76 KG/M2 | DIASTOLIC BLOOD PRESSURE: 97 MMHG

## 2023-12-11 DIAGNOSIS — I10 ESSENTIAL HYPERTENSION: ICD-10-CM

## 2023-12-11 DIAGNOSIS — I50.22 CHRONIC HFREF (HEART FAILURE WITH REDUCED EJECTION FRACTION): Primary | ICD-10-CM

## 2023-12-11 DIAGNOSIS — I77.810 AORTIC ROOT DILATION: ICD-10-CM

## 2023-12-11 DIAGNOSIS — D15.1 PAPILLARY FIBROELASTOMA: ICD-10-CM

## 2023-12-11 DIAGNOSIS — I49.3 PVC'S (PREMATURE VENTRICULAR CONTRACTIONS): ICD-10-CM

## 2023-12-11 LAB
ABSOLUTE LUNG FLUID CONTENT: 29 % (ref 20–35)
ANION GAP SERPL CALCULATED.3IONS-SCNC: 8 MMOL/L (ref 5–15)
BUN SERPL-MCNC: 28 MG/DL (ref 8–23)
BUN/CREAT SERPL: 28 (ref 7–25)
CALCIUM SPEC-SCNC: 9.7 MG/DL (ref 8.6–10.5)
CHLORIDE SERPL-SCNC: 104 MMOL/L (ref 98–107)
CO2 SERPL-SCNC: 29 MMOL/L (ref 22–29)
CREAT SERPL-MCNC: 1 MG/DL (ref 0.57–1)
DEPRECATED RDW RBC AUTO: 50.4 FL (ref 37–54)
EGFRCR SERPLBLD CKD-EPI 2021: 63 ML/MIN/1.73
ERYTHROCYTE [DISTWIDTH] IN BLOOD BY AUTOMATED COUNT: 13.8 % (ref 12.3–15.4)
FERRITIN SERPL-MCNC: 105.1 NG/ML (ref 13–150)
GLUCOSE SERPL-MCNC: 168 MG/DL (ref 65–99)
HCT VFR BLD AUTO: 45.8 % (ref 34–46.6)
HGB BLD-MCNC: 15.3 G/DL (ref 12–15.9)
IRON 24H UR-MRATE: 146 MCG/DL (ref 37–145)
IRON SATN MFR SERPL: 45 % (ref 20–50)
MCH RBC QN AUTO: 32.6 PG (ref 26.6–33)
MCHC RBC AUTO-ENTMCNC: 33.4 G/DL (ref 31.5–35.7)
MCV RBC AUTO: 97.4 FL (ref 79–97)
NT-PROBNP SERPL-MCNC: 1225 PG/ML (ref 0–900)
PLATELET # BLD AUTO: 200 10*3/MM3 (ref 140–450)
PMV BLD AUTO: 10.7 FL (ref 6–12)
POTASSIUM SERPL-SCNC: 3.9 MMOL/L (ref 3.5–5.2)
RBC # BLD AUTO: 4.7 10*6/MM3 (ref 3.77–5.28)
SODIUM SERPL-SCNC: 141 MMOL/L (ref 136–145)
TIBC SERPL-MCNC: 323 MCG/DL (ref 298–536)
TRANSFERRIN SERPL-MCNC: 217 MG/DL (ref 200–360)
WBC NRBC COR # BLD AUTO: 7.27 10*3/MM3 (ref 3.4–10.8)

## 2023-12-11 PROCEDURE — 82728 ASSAY OF FERRITIN: CPT | Performed by: HOSPITALIST

## 2023-12-11 PROCEDURE — G0463 HOSPITAL OUTPT CLINIC VISIT: HCPCS | Performed by: HOSPITALIST

## 2023-12-11 PROCEDURE — 94726 PLETHYSMOGRAPHY LUNG VOLUMES: CPT | Performed by: HOSPITALIST

## 2023-12-11 PROCEDURE — 99215 OFFICE O/P EST HI 40 MIN: CPT | Performed by: HOSPITALIST

## 2023-12-11 PROCEDURE — 85027 COMPLETE CBC AUTOMATED: CPT | Performed by: HOSPITALIST

## 2023-12-11 PROCEDURE — 80048 BASIC METABOLIC PNL TOTAL CA: CPT | Performed by: HOSPITALIST

## 2023-12-11 PROCEDURE — 83880 ASSAY OF NATRIURETIC PEPTIDE: CPT | Performed by: HOSPITALIST

## 2023-12-11 PROCEDURE — 83540 ASSAY OF IRON: CPT | Performed by: HOSPITALIST

## 2023-12-11 PROCEDURE — 84466 ASSAY OF TRANSFERRIN: CPT | Performed by: HOSPITALIST

## 2023-12-11 RX ORDER — ASPIRIN 81 MG/1
81 TABLET ORAL DAILY
COMMUNITY
Start: 2023-12-11

## 2023-12-11 RX ORDER — MULTIPLE VITAMINS W/ MINERALS TAB 9MG-400MCG
1 TAB ORAL DAILY
COMMUNITY

## 2023-12-11 RX ORDER — CARVEDILOL 6.25 MG/1
6.25 TABLET ORAL 2 TIMES DAILY
Qty: 60 TABLET | Refills: 11 | Status: SHIPPED | OUTPATIENT
Start: 2023-12-11

## 2023-12-11 NOTE — PROGRESS NOTES
Reason For Visit:  CHF    Subjective        Yuli Covarrubias is a 63 y.o. female with the below pertinent PMH who presents for follow-up of CHF.    Ms. Covarrubias was most recently seen in the CHF clinic 9/11/2023 at which time she appeared euvolemic and was tolerating her medications well.  She was reluctant to make any changes to her regimen, so medications remain the same.  She was scheduled for a follow-up echo and a stress test.    Today, Ms. Covarrubias reports that she has been continuing to improve since her last visit.  She does still have fatigue as her primary symptom, but this has gotten better to the point where she has been considering going back to work.  She has not noticed any significant dyspnea on exertion, but she also states that she has not done much activity recently.  She cannot recall on any long walks, and she states that the majority of her exertion is doing chores around the house.  She denies any significant chest pain or shortness of breath with her housework.  She also denies significant palpitations or lightheadedness recently.  She has not noticed any peripheral edema.    ROS: Pertinent findings are included above.    Cardiac Studies  Echo 2/11/2022: LVEF 56-60% with severe concentric hypertrophy and reportedly moderate LV dilation, G2DD, normal RV size/function, mild LA dilation, mild MR.  LHC 2/12/2023: No significant epicardial CAD although all coronaries were very torturous consistent with a longstanding history of hypertensive disease.  Elevated LVEDP (31).  LVEF 60% with no significant gradient across the AV on pullback.  Echo 4/2023: reportedly demonstrated LVH with markedly enlarged LA, enlarged RA, enlarged RV, akinetic septal wall, hypokinetic inferior posterior wall, EF 20-25%, mild to moderate MR and TR.  Echo 12/6/2023: LVEF mildly to moderately reduced (calculated 39.7%, visually estimated to 36-45%), LV mildly dilated and wall thickness mildly increased, LV mass consistent with  "severe concentric hypertrophy, abnormal diastolic function with indeterminate grade, RV mildly dilated with mildly reduced systolic function, LA moderately dilated, small echodensity on AV right coronary leaflet consistent with possible small papillary fibroelastoma, trace to mild posteriorly directed AV regurgitation, RVSP at least 43 (likely an underestimate due to incomplete TR CW jet), mild dilation of the ascending aorta (4.1 cm), trivial pericardial effusion.  Lexiscan stress SPECT 12/6/2023: Low risk for stress-induced ischemia.  Relatively decreased tracer uptake of the septum that is fixed consistent with possible scar versus artifact, no myocardial perfusion evidence of significant ischemia, LVEF 45% with mild septal hypokinesis.    Pertinent PMH  HFrEF  Hypertension  Hyperlipidemia    Pertinent past medical, surgical, family, and social history were reviewed.      Current Outpatient Medications:     carvedilol (COREG) 3.125 MG tablet, Take 1 tablet by mouth 2 (Two) Times a Day., Disp: 180 tablet, Rfl: 3    chlorthalidone (HYGROTON) 25 MG tablet, Take 1 tablet by mouth Daily., Disp: 30 tablet, Rfl: 11    empagliflozin (Jardiance) 10 MG tablet tablet, Take 1 tablet by mouth Daily., Disp: 30 tablet, Rfl: 11    sacubitril-valsartan (Entresto)  MG tablet, Take 1 tablet by mouth 2 (Two) Times a Day., Disp: 60 tablet, Rfl: 11    spironolactone (ALDACTONE) 50 MG tablet, Take 1 tablet by mouth Daily., Disp: 30 tablet, Rfl: 11     Objective   Vital Signs:  /97 (BP Location: Right arm, Patient Position: Sitting)   Pulse 99   Wt 80.8 kg (178 lb 3.2 oz)   SpO2 90%   BMI 28.76 kg/m²   Estimated body mass index is 28.76 kg/m² as calculated from the following:    Height as of 12/6/23: 167.6 cm (66\").    Weight as of this encounter: 80.8 kg (178 lb 3.2 oz).      Constitutional:       Appearance: Healthy appearance. Not in distress.   Neck:      Vascular: JVD normal.   Pulmonary:      Effort: Pulmonary " effort is normal.      Breath sounds: Normal breath sounds.   Cardiovascular:      Normal rate. Predominantly irregularly irregular rhythm with occasional premature beats      Murmurs: There is no murmur.      No gallop.  No click. No rub.   Edema:     Peripheral edema absent.   Abdominal:      General: There is no distension.      Palpations: Abdomen is soft.      Tenderness: There is no abdominal tenderness.   Skin:     General: Skin is warm and dry.   Neurological:      Mental Status: Alert and oriented to person, place and time.        Result Review :  The following data was reviewed by: Jeremiah Hall MD on 12/11/2023:  BMP   BMP          8/4/2023    08:04 9/11/2023    12:52 12/11/2023    11:28   BMP   BUN 20  21  28    Creatinine 0.97  1.00  1.00    Sodium 139  139  141    Potassium 3.6  3.6  3.9    Chloride 100  101  104    CO2 29.0  29.0  29.0    Calcium 9.1  9.2  9.7      Pro-BNP 1225 (previously 86544 6 months ago).    ReDS 29%              Assessment and Plan   Diagnoses and all orders for this visit:    1. Chronic HFrEF (heart failure with reduced ejection fraction) (Primary)  2. Essential hypertension  3. PVC's (premature ventricular contractions)  - Etiology: Appears nonischemic given LHC in the last 2 years showing no significant CAD and recent Lexiscan SPECT showing no significant ischemia.  She does have regional wall motion abnormalities on her echo and relatively decreased tracer uptake in the septum on her SPECT which may suggest an infiltrative process.  She is being scheduled for cardiac MRI for further evaluation of likely NICM.  Additionally, she has had frequent PVCs on ECG in the past, so I am obtaining a cardiac monitor today to assess PVC burden.  - LVEF: 35-45%.  - NYHA Class: II  - BB: Increase carvedilol to 6.25 mg twice daily  - ACEi/ARB/ARNi: Entresto 97/103 mg twice daily  - SGLT2i: Jardiance 12.5 mg daily (dose provided by [facility])  - MRA: Spironolactone 50 mg daily  - Volume: Appears  relatively euvolemic on exam today  - Diuretics: Chlorthalidone 25 mg daily as she has been reluctant to transition back to loop diuretics due to frequent urination and hypokalemia issues.  If further diuresis needed, would initially increase chlorthalidone to 50 mg daily and add a low-dose triamterene; could try higher dose of triamterene with loop diuretic if needed.  - ICD/CRT: Not indicated  - IV Iron: Previously met CHF criteria for IV iron infusions, but this was 5 months ago.  I discussed this with her today, and she is amenable to IV iron if indicated.  I will obtain a CBC and iron studies with plan for IV iron if meeting criteria    4. Papillary fibroelastoma  - MRI Cardiac Function Complete With & Without Morphology; Future  - Starting aspirin 81 mg daily    5. Aortic root dilation  - CT Angiogram Chest  - BP management as noted above with emphasis on beta-blocker    Follow Up   Return in about 4 weeks (around 1/8/2024).  Patient was given instructions and counseling regarding her condition or for health maintenance advice. Please see specific information pulled into the AVS if appropriate.     I spent 50 minutes caring for Yuli on this date of service. This time includes time spent by me in the following activities:preparing for the visit, reviewing tests, performing a medically appropriate examination and/or evaluation , counseling and educating the patient/family/caregiver, ordering medications, tests, or procedures, documenting information in the medical record, and care coordination    EMR Dragon/Transcription disclaimer: Much of this encounter note is an electronic transcription/translation of spoken language to printed text. The electronic translation of spoken language may permit erroneous, or at times, nonsensical words or phrases to be inadvertently transcribed; although I have reviewed the note for such errors, some may still exist.

## 2023-12-20 ENCOUNTER — HOSPITAL ENCOUNTER (OUTPATIENT)
Dept: CT IMAGING | Facility: HOSPITAL | Age: 63
Discharge: HOME | End: 2023-12-20
Payer: OTHER GOVERNMENT

## 2023-12-20 ENCOUNTER — HOSPITAL ENCOUNTER (OUTPATIENT)
Dept: CARDIOLOGY | Facility: HOSPITAL | Age: 63
Discharge: HOME | End: 2023-12-20
Payer: OTHER GOVERNMENT

## 2023-12-20 DIAGNOSIS — I49.3 PVC'S (PREMATURE VENTRICULAR CONTRACTIONS): ICD-10-CM

## 2023-12-20 PROCEDURE — 71275 CT ANGIOGRAPHY CHEST: CPT

## 2023-12-20 PROCEDURE — 93246 EXT ECG>7D<15D RECORDING: CPT

## 2023-12-20 PROCEDURE — 25510000001 IOPAMIDOL PER 1 ML: Performed by: HOSPITALIST

## 2023-12-20 RX ADMIN — IOPAMIDOL 100 ML: 755 INJECTION, SOLUTION INTRAVENOUS at 10:04

## 2023-12-21 ENCOUNTER — TELEPHONE (OUTPATIENT)
Dept: CARDIOLOGY | Facility: HOSPITAL | Age: 63
End: 2023-12-21

## 2023-12-21 NOTE — TELEPHONE ENCOUNTER
----- Message from Jeremiah Hall MD sent at 12/20/2023  8:39 PM CST -----  Please notify Ms. Covarrubias of her CT result. It shows aortic root dilation consistent with what was previously seen on her echo although it was measured a little larger on the CT scan. I will plan to get a repeat CT scan to monitor this in the future. The main management will be continuing to work on getting her blood pressure under control. There CT also showed some small pulmonary nodules, which we will monitor with her follow-up CT to make sure there is no increase in size (they are likely benign, but we will monitor them just in case). We can review the CT in detail at her follow-up as well. Thank you!

## 2024-02-09 ENCOUNTER — HOSPITAL ENCOUNTER (OUTPATIENT)
Dept: CARDIOLOGY | Facility: HOSPITAL | Age: 64
Discharge: HOME | End: 2024-02-09
Payer: OTHER GOVERNMENT

## 2024-02-09 VITALS
WEIGHT: 186.4 LBS | OXYGEN SATURATION: 90 % | SYSTOLIC BLOOD PRESSURE: 148 MMHG | HEART RATE: 113 BPM | DIASTOLIC BLOOD PRESSURE: 99 MMHG | BODY MASS INDEX: 30.09 KG/M2

## 2024-02-09 DIAGNOSIS — I50.22 CHRONIC HFREF (HEART FAILURE WITH REDUCED EJECTION FRACTION): Primary | ICD-10-CM

## 2024-02-09 DIAGNOSIS — I10 ESSENTIAL HYPERTENSION: ICD-10-CM

## 2024-02-09 DIAGNOSIS — R91.1 LUNG NODULE SEEN ON IMAGING STUDY: ICD-10-CM

## 2024-02-09 DIAGNOSIS — I77.810 AORTIC ROOT DILATION: ICD-10-CM

## 2024-02-09 LAB
ANION GAP SERPL CALCULATED.3IONS-SCNC: 10 MMOL/L (ref 5–15)
BUN SERPL-MCNC: 20 MG/DL (ref 8–23)
BUN/CREAT SERPL: 20.8 (ref 7–25)
CALCIUM SPEC-SCNC: 9.8 MG/DL (ref 8.6–10.5)
CHLORIDE SERPL-SCNC: 98 MMOL/L (ref 98–107)
CO2 SERPL-SCNC: 30 MMOL/L (ref 22–29)
CREAT SERPL-MCNC: 0.96 MG/DL (ref 0.57–1)
EGFRCR SERPLBLD CKD-EPI 2021: 66.2 ML/MIN/1.73
GLUCOSE SERPL-MCNC: 115 MG/DL (ref 65–99)
POTASSIUM SERPL-SCNC: 3.7 MMOL/L (ref 3.5–5.2)
SODIUM SERPL-SCNC: 138 MMOL/L (ref 136–145)

## 2024-02-09 PROCEDURE — 80048 BASIC METABOLIC PNL TOTAL CA: CPT

## 2024-02-09 PROCEDURE — G0463 HOSPITAL OUTPT CLINIC VISIT: HCPCS

## 2024-02-09 RX ORDER — CARVEDILOL 12.5 MG/1
12.5 TABLET ORAL 2 TIMES DAILY
Qty: 60 TABLET | Refills: 11 | Status: SHIPPED | OUTPATIENT
Start: 2024-02-09

## 2024-02-09 NOTE — PROGRESS NOTES
[facility]  Heart Failure Clinic  Subjective:     Encounter Date:02/09/2024      Patient ID: Yuli Covarrubias is a 63 y.o. female     Chief Complaint:  History of Present Illness  Yuli Covarrubias is a 63 y.o. female with the below pertinent PMH who presents for follow-up of congestive heart failure.    Patient was last seen 12/11/2023.  At that time she continued to improve.  She was tolerating her medicines well, however did have some ongoing fatigue.  Did not notice any significant dyspnea on exertion.  Dr. Hall increased her carvedilol to 6.25 mg twice daily.    Since that time she has been doing reasonably well from a heart rate standpoint.  She still has some elevated heart rates and blood pressures at home.  She has had some dietary indiscretion over the holidays and has had had some weight gain.  Breathing stable.  No lower extremity swelling recently.    ROS: Pertinent findings as noted above    Pertinent PMH  HFrEF  Hypertension  Hyperlipidemia    The following portions of the patient's history were reviewed and updated as appropriate: allergies, current medications, past medical history, past social history, past and problem list.    No Known Allergies    Current Outpatient Medications:     aspirin 81 MG EC tablet, Take 1 tablet by mouth Daily., Disp: , Rfl:     carvedilol (COREG) 12.5 MG tablet, Take 1 tablet by mouth 2 (Two) Times a Day., Disp: 60 tablet, Rfl: 11    chlorthalidone (HYGROTON) 25 MG tablet, Take 1 tablet by mouth Daily., Disp: 30 tablet, Rfl: 11    empagliflozin (Jardiance) 25 MG tablet tablet, Take 0.5 tablets by mouth Daily., Disp: , Rfl:     multivitamin with minerals tablet tablet, Take 1 tablet by mouth Daily., Disp: , Rfl:     sacubitril-valsartan (Entresto)  MG tablet, Take 1 tablet by mouth 2 (Two) Times a Day., Disp: 60 tablet, Rfl: 11    spironolactone (ALDACTONE) 50 MG tablet, Take 1 tablet by mouth Daily., Disp: 30 tablet, Rfl: 11    Social History      Socioeconomic History    Marital status:    Tobacco Use    Smoking status: Never    Smokeless tobacco: Never   Vaping Use    Vaping Use: Never used   Substance and Sexual Activity    Alcohol use: Never    Drug use: Never                Objective:     Constitutional:       Appearance: Healthy appearance. Not in distress.   Pulmonary:      Effort: Pulmonary effort is normal.      Breath sounds: Normal breath sounds.   Cardiovascular:      PMI at left midclavicular line. Tachycardia present. Regular rhythm.      Murmurs: There is no murmur.      No gallop.  No click. No rub.   Edema:     Peripheral edema absent.   Abdominal:      General: Bowel sounds are normal.   Musculoskeletal: Normal range of motion.      Cervical back: Normal range of motion and neck supple. Skin:     General: Skin is warm.   Neurological:      Mental Status: Alert and oriented to person, place and time.           Procedures  /99 (BP Location: Right arm, Patient Position: Sitting)   Pulse 113   Wt 84.6 kg (186 lb 6.4 oz)   SpO2 90%   BMI 30.09 kg/m²       02/09/24  1031   Weight: 84.6 kg (186 lb 6.4 oz)      Lab Review:   I have reviewed      BMP          9/11/2023    12:52 12/11/2023    11:28 2/9/2024    10:29   BMP   BUN 21  28  20    Creatinine 1.00  1.00  0.96    Sodium 139  141  138    Potassium 3.6  3.9  3.7    Chloride 101  104  98    CO2 29.0  29.0  30.0    Calcium 9.2  9.7  9.8       Lab Results   Component Value Date    CHOL 109 05/30/2023    TRIG 80 05/30/2023    HDL 38 (L) 05/30/2023    LDL 55 05/30/2023      Results for orders placed during the hospital encounter of 12/06/23    Adult Transthoracic Echo Complete w/ Color, Spectral and Contrast if necessary per protocol    Interpretation Summary    Left ventricular systolic function is mildly to moderately reduced. Calculated ejection fraction is 39.7% with visually estimated ejection fraction 36-45%. There is hypokinesis of the basal and mid septum.    The left  ventricular cavity is mildly dilated. Left ventricular wall thickness is mildly increased, and mass is consistent with severe concentric hypertrophy.    Left ventricular diastolic function is abnormal with indeterminate grade.    The right ventricular cavity is mildly dilated with mildly reduced systolic function.    The left atrial cavity is moderately dilated.    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 There is trace to mild, posteriorly directed aortic valve regurgitation.    Pulmonary hypertension is present with estimated RVSP at least 43 mmHg, which is likely an underestimate due to incomplete TR CW jet.    Mild dilation of the ascending aorta (4.1 cm).    There is a trivial pericardial effusion. There is no evidence of cardiac tamponade.       Assessment and Plan   Diagnoses and all orders for this visit:    1. Chronic HFrEF (heart failure with reduced ejection fraction) (Primary)  2. Essential hypertension  - Etiology: Nonischemic (negative left heart cath, Holter monitor, cardiac MRI)  - LVEF: 42%  - NYHA Class: II  - BB: Increase carvedilol to 12.5 mg twice daily  - ACEi/ARB/ARNi: Entresto 97/103 mg twice daily  - SGLT2i: Jardiance 12.5 mg daily (dose provided by [facility])  - MRA: Spironolactone 50 mg daily  - Volume: Euvolemic on exam  - Diuretics: Patient declines loop diuretics, continue chlorthalidone 25 mg daily  - ICD/CRT: Not indicated  - IV Iron: Recent lab work shows patient does not qualify for IV iron.    3. Aortic root dilation  4. Lung nodule seen on imaging study  -CT angio chest shows 4.4 cm aortic root dilation.  Working on hypertensive regimen as noted above.  Will plan on yearly scan of this  - 2 pulmonary nodules noted on scan as well, recommend 1 year follow-up of this.               Follow Up   No follow-ups on file.  Patient was given instructions and counseling regarding her condition  or for health maintenance advice. Please see specific information pulled into the AVS if appropriate.       EMR Dragon/Transcription disclaimer: Much of this encounter note is an electronic transcription/translation of spoken language to printed text. The electronic translation of spoken language may permit erroneous, or at times, nonsensical words or phrases to be inadvertently transcribed; although I have reviewed the note for such errors, some may still exist.

## 2024-03-20 ENCOUNTER — HOSPITAL ENCOUNTER (OUTPATIENT)
Dept: CARDIOLOGY | Facility: HOSPITAL | Age: 64
Discharge: HOME | End: 2024-03-20
Payer: OTHER GOVERNMENT

## 2024-03-20 ENCOUNTER — HOSPITAL ENCOUNTER (OUTPATIENT)
Dept: GENERAL RADIOLOGY | Facility: HOSPITAL | Age: 64
Discharge: HOME | End: 2024-03-20
Payer: OTHER GOVERNMENT

## 2024-03-20 VITALS
HEART RATE: 110 BPM | SYSTOLIC BLOOD PRESSURE: 161 MMHG | BODY MASS INDEX: 29.92 KG/M2 | WEIGHT: 185.4 LBS | DIASTOLIC BLOOD PRESSURE: 116 MMHG | OXYGEN SATURATION: 88 %

## 2024-03-20 DIAGNOSIS — R06.02 SHORTNESS OF BREATH: ICD-10-CM

## 2024-03-20 DIAGNOSIS — I77.810 AORTIC ROOT DILATION: ICD-10-CM

## 2024-03-20 DIAGNOSIS — I50.22 CHRONIC HFREF (HEART FAILURE WITH REDUCED EJECTION FRACTION): ICD-10-CM

## 2024-03-20 DIAGNOSIS — I10 ESSENTIAL HYPERTENSION: ICD-10-CM

## 2024-03-20 DIAGNOSIS — R91.1 LUNG NODULE SEEN ON IMAGING STUDY: ICD-10-CM

## 2024-03-20 LAB
ABSOLUTE LUNG FLUID CONTENT: 28 % (ref 20–35)
ANION GAP SERPL CALCULATED.3IONS-SCNC: 8 MMOL/L (ref 5–15)
BUN SERPL-MCNC: 15 MG/DL (ref 8–23)
BUN/CREAT SERPL: 16.9 (ref 7–25)
CALCIUM SPEC-SCNC: 9.3 MG/DL (ref 8.6–10.5)
CHLORIDE SERPL-SCNC: 103 MMOL/L (ref 98–107)
CO2 SERPL-SCNC: 31 MMOL/L (ref 22–29)
CREAT SERPL-MCNC: 0.89 MG/DL (ref 0.57–1)
EGFRCR SERPLBLD CKD-EPI 2021: 72.1 ML/MIN/1.73
GLUCOSE SERPL-MCNC: 116 MG/DL (ref 65–99)
POTASSIUM SERPL-SCNC: 3.9 MMOL/L (ref 3.5–5.2)
SODIUM SERPL-SCNC: 142 MMOL/L (ref 136–145)

## 2024-03-20 PROCEDURE — 94726 PLETHYSMOGRAPHY LUNG VOLUMES: CPT

## 2024-03-20 PROCEDURE — 80048 BASIC METABOLIC PNL TOTAL CA: CPT

## 2024-03-20 PROCEDURE — 71046 X-RAY EXAM CHEST 2 VIEWS: CPT

## 2024-03-20 NOTE — PROGRESS NOTES
Heart Failure Clinic    Date: 03/20/24     Vitals:    03/20/24 1025   BP: (!) 161/116   Pulse: 110   SpO2: (!) 88%        Indication:  Heart Failure    Procedure:  ReDS device sensor unit applied to right side of chest and right side of back.  Appropriate positioning confirmed based off of the unit's calculation.  Chest measurement obtained with the chest size ruler.  Measurement session performed over 45 seconds.      Method of arrival: Ambulatory    Weighing self daily: Most days    Taking medications as prescribed: Yes    Edema: No    Shortness of Air: Yes, patient has chronic congestion    Number of pillows used at night: <2, patient sleeps on one pillow    Results: ReDS Value= 28    Interpretation:  25-35% - normal/ideal lung fluid content    Mallory L Haase, RN 03/20/24 10:26 CDT

## 2024-03-20 NOTE — PROGRESS NOTES
[facility]  Heart Failure Clinic  Subjective:     Encounter Date:03/20/2024      Patient ID: Yuli Covarrubias is a 64 y.o. female     Chief Complaint:  History of Present Illness  Yuli Covarrubias is a 64 y.o. female with the below pertinent PMH who presents for follow-up of congestive heart failure.    Patient was last seen 2/9/2024.  At that appointment she was doing reasonably well from a heart failure standpoint.  She was having some elevated heart rates and blood pressures at home.  She had some weight gain that she attributes to dietary indiscretion.  Denied any peripheral edema.  I did increase her carvedilol 12.5 mg twice daily.    Since her previous appointment she denies any cardiac concerns.  She reports compliance with medication regimen without any significant weight changes.  She does report normal tensive blood pressure readings at home sometimes systolic in the 110s.  She denies any chest pain.  She does report over the last month or so worsening of her shortness of breath.  She feels like she has a lot of sinus pressure.  She reports no history of smoking, but significant secondhand smoke exposure.  She reportedly was told by provider greater than a year ago that she should be seen by pulmonologist for full lung workup.    Of note, patient has not taken her carvedilol, chlorthalidone, or spironolactone today    ROS: Pertinent findings as noted above    Pertinent PMH  HFrEF  Hypertension  Hyperlipidemia    The following portions of the patient's history were reviewed and updated as appropriate: allergies, current medications, past medical history, past social history, past and problem list.    No Known Allergies    Current Outpatient Medications:     aspirin 81 MG EC tablet, Take 1 tablet by mouth Daily., Disp: , Rfl:     carvedilol (COREG) 12.5 MG tablet, Take 1 tablet by mouth 2 (Two) Times a Day., Disp: 60 tablet, Rfl: 11    chlorthalidone (HYGROTON) 25 MG tablet, Take 1 tablet by  mouth Daily., Disp: 30 tablet, Rfl: 11    empagliflozin (Jardiance) 25 MG tablet tablet, Take 0.5 tablets by mouth Daily., Disp: 90 tablet, Rfl: 3    multivitamin with minerals tablet tablet, Take 1 tablet by mouth Daily., Disp: , Rfl:     sacubitril-valsartan (Entresto)  MG tablet, Take 1 tablet by mouth 2 (Two) Times a Day., Disp: 60 tablet, Rfl: 11    spironolactone (ALDACTONE) 50 MG tablet, Take 1 tablet by mouth Daily., Disp: 30 tablet, Rfl: 11    ipratropium (ATROVENT HFA) 17 MCG/ACT inhaler, Inhale 2 puffs 4 (Four) Times a Day., Disp: 12.9 g, Rfl: 1    Social History     Socioeconomic History    Marital status:    Tobacco Use    Smoking status: Never    Smokeless tobacco: Never   Vaping Use    Vaping status: Never Used   Substance and Sexual Activity    Alcohol use: Never    Drug use: Never                Objective:     Constitutional:       Appearance: Healthy appearance. Not in distress.   Pulmonary:      Effort: Pulmonary effort is normal.      Breath sounds: Normal breath sounds.   Cardiovascular:      PMI at left midclavicular line. Normal rate. Regular rhythm.      Murmurs: There is no murmur.      No gallop.  No click. No rub.   Edema:     Peripheral edema absent.   Abdominal:      General: Bowel sounds are normal.   Musculoskeletal: Normal range of motion.      Cervical back: Normal range of motion and neck supple. Skin:     General: Skin is warm.   Neurological:      Mental Status: Alert and oriented to person, place and time.           Procedures  BP (!) 161/116 (BP Location: Right arm, Patient Position: Sitting)   Pulse 110   Wt 84.1 kg (185 lb 6.4 oz)   SpO2 (!) 88%   BMI 29.92 kg/m²       03/20/24  1025   Weight: 84.1 kg (185 lb 6.4 oz)      Lab Review:   I have reviewed      BMP          12/11/2023    11:28 2/9/2024    10:29 3/20/2024    10:20   BMP   BUN 28  20  15    Creatinine 1.00  0.96  0.89    Sodium 141  138  142    Potassium 3.9  3.7  3.9    Chloride 104  98  103    CO2  29.0  30.0  31.0    Calcium 9.7  9.8  9.3       Lab Results   Component Value Date    CHOL 109 05/30/2023    TRIG 80 05/30/2023    HDL 38 (L) 05/30/2023    LDL 55 05/30/2023      Results for orders placed during the hospital encounter of 12/06/23    Adult Transthoracic Echo Complete w/ Color, Spectral and Contrast if necessary per protocol    Interpretation Summary    Left ventricular systolic function is mildly to moderately reduced. Calculated ejection fraction is 39.7% with visually estimated ejection fraction 36-45%. There is hypokinesis of the basal and mid septum.    The left ventricular cavity is mildly dilated. Left ventricular wall thickness is mildly increased, and mass is consistent with severe concentric hypertrophy.    Left ventricular diastolic function is abnormal with indeterminate grade.    The right ventricular cavity is mildly dilated with mildly reduced systolic function.    The left atrial cavity is moderately dilated.    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 There is trace to mild, posteriorly directed aortic valve regurgitation.    Pulmonary hypertension is present with estimated RVSP at least 43 mmHg, which is likely an underestimate due to incomplete TR CW jet.    Mild dilation of the ascending aorta (4.1 cm).    There is a trivial pericardial effusion. There is no evidence of cardiac tamponade.       Assessment and Plan   Diagnoses and all orders for this visit:    1. Chronic HFrEF (heart failure with reduced ejection fraction) (Primary)  2. Essential hypertension  - Etiology: Nonischemic (negative left heart cath, Holter monitor, cardiac MRI)  - LVEF: 42%  - NYHA Class: II  - BB:  carvedilol to 12.5 mg twice daily  - ACEi/ARB/ARNi: Entresto 97/103 mg twice daily  - SGLT2i: Jardiance 12.5 mg daily ([facility])  - MRA: Spironolactone 50 mg daily  - Volume: Euvolemic on exam  -  Diuretics: Patient declines loop diuretics, continue chlorthalidone 25 mg daily  - ICD/CRT: Not indicated  - IV Iron: Recent lab work shows patient does not qualify for IV iron.    Patient peers euvolemic on exam today with stable weights and previous appointment.  Lab work is stable.  She has not taken 3 of her medications which is most likely the etiology of her hypertensive readings and tachycardia in the clinic.  More concerned about potential lung etiology for her hypoxia, see below.  Will not make any changes to her cardiac regimen and see her back in 1 month.    3. Aortic root dilation  4. Lung nodule seen on imaging study  5. Shortness of breath  Patient noted to be slightly hypoxic in the clinic today.  Patient reports over the last month that her shortness of breath has worsened.  She has been having sinus issues, however she has also noticed some wheezing, this is new for her.  She never reports being a smoker, however she has significant exposure to secondhand smoke over the years.  The patient reports never having a formal lung study and actually been recommended to see a pulmonologist over a year ago  - Will order pulmonary function test to be done here at [facility] to rule out lung etiologies  - Will order 2 view chest x-ray to be done today  - Have sent in a prescription for Atrovent for patient to use at this time (would not do albuterol given her high resting heart rate)  - Will place order for referral to outpatient pulmonology.  -CT angio chest shows 4.4 cm aortic root dilation.  Working on hypertensive regimen as noted above.  Will plan on yearly scan of this  - 2 pulmonary nodules noted on scan as well, recommend 1 year follow-up of this.             I spent 50 minutes caring for Yuli on this date of service. This time includes time spent by me in the following activities:preparing for the visit, reviewing tests, obtaining and/or reviewing a separately obtained history, performing a medically  appropriate examination and/or evaluation , counseling and educating the patient/family/caregiver, ordering medications, tests, or procedures, referring and communicating with other health care professionals , documenting information in the medical record, independently interpreting results and communicating that information with the patient/family/caregiver, and care coordination    Follow Up   No follow-ups on file.  Patient was given instructions and counseling regarding her condition or for health maintenance advice. Please see specific information pulled into the AVS if appropriate.     Part of this note may be an electronic transcription/translation of spoken language to printed text using the Dragon Dictation System.

## 2024-04-15 ENCOUNTER — TELEPHONE (OUTPATIENT)
Dept: CARDIOLOGY | Facility: HOSPITAL | Age: 64
End: 2024-04-15
Payer: OTHER GOVERNMENT

## 2024-04-15 NOTE — TELEPHONE ENCOUNTER
"----- Message from Soumya Long RN sent at 4/15/2024  9:30 AM CDT -----  Regarding: FW: Medication - inhaler   Contact: [phone]    ----- Message -----  From: Yuli Covarrubias \"Hue\"  Sent: 4/15/2024   8:21 AM CDT  To: [facility] Clinical Pool  Subject: Medication - inhaler                             I was hoping alessio/Jcarlos  could change the inhaler rx -  and [facility] than [facility]- as they want $550.+ for it .  [facility] might not [facility] or charge a large [facility]ay (like they do with jardiance). [facility]d ask the [facility] But lost my paper with the inhaler name on it  "

## 2024-05-16 ENCOUNTER — HOSPITAL ENCOUNTER (OUTPATIENT)
Dept: CARDIOLOGY | Facility: HOSPITAL | Age: 64
Discharge: HOME | End: 2024-05-16
Admitting: HOSPITALIST
Payer: OTHER GOVERNMENT

## 2024-05-16 VITALS
HEART RATE: 93 BPM | OXYGEN SATURATION: 90 % | DIASTOLIC BLOOD PRESSURE: 87 MMHG | WEIGHT: 189 LBS | SYSTOLIC BLOOD PRESSURE: 140 MMHG | BODY MASS INDEX: 30.51 KG/M2

## 2024-05-16 DIAGNOSIS — R91.1 LUNG NODULE SEEN ON IMAGING STUDY: ICD-10-CM

## 2024-05-16 DIAGNOSIS — I50.22 CHRONIC HFREF (HEART FAILURE WITH REDUCED EJECTION FRACTION): Primary | ICD-10-CM

## 2024-05-16 DIAGNOSIS — I10 ESSENTIAL HYPERTENSION: ICD-10-CM

## 2024-05-16 DIAGNOSIS — R06.02 SHORTNESS OF BREATH: ICD-10-CM

## 2024-05-16 DIAGNOSIS — D15.1 PAPILLARY FIBROELASTOMA: ICD-10-CM

## 2024-05-16 DIAGNOSIS — I77.810 AORTIC ROOT DILATION: ICD-10-CM

## 2024-05-16 LAB
ABSOLUTE LUNG FLUID CONTENT: 31 % (ref 20–35)
ANION GAP SERPL CALCULATED.3IONS-SCNC: 6 MMOL/L (ref 5–15)
BUN SERPL-MCNC: 21 MG/DL (ref 8–23)
BUN/CREAT SERPL: 22.8 (ref 7–25)
CALCIUM SPEC-SCNC: 9.3 MG/DL (ref 8.6–10.5)
CHLORIDE SERPL-SCNC: 104 MMOL/L (ref 98–107)
CO2 SERPL-SCNC: 29 MMOL/L (ref 22–29)
CREAT SERPL-MCNC: 0.92 MG/DL (ref 0.57–1)
EGFRCR SERPLBLD CKD-EPI 2021: 69.2 ML/MIN/1.73
GLUCOSE SERPL-MCNC: 128 MG/DL (ref 65–99)
POTASSIUM SERPL-SCNC: 4 MMOL/L (ref 3.5–5.2)
SODIUM SERPL-SCNC: 139 MMOL/L (ref 136–145)

## 2024-05-16 PROCEDURE — 80048 BASIC METABOLIC PNL TOTAL CA: CPT | Performed by: HOSPITALIST

## 2024-05-16 PROCEDURE — 94726 PLETHYSMOGRAPHY LUNG VOLUMES: CPT | Performed by: HOSPITALIST

## 2024-05-16 RX ORDER — CARVEDILOL 25 MG/1
25 TABLET ORAL 2 TIMES DAILY
Qty: 180 TABLET | Refills: 3 | Status: SHIPPED | OUTPATIENT
Start: 2024-05-16

## 2024-05-16 NOTE — ADDENDUM NOTE
Encounter addended by: Jeremiah Hall MD on: 5/16/2024 12:21 PM   Actions taken: Clinical Note Signed

## 2024-05-16 NOTE — PROGRESS NOTES
Reason For Visit:  CHF    Subjective        Yuli Covarrubias is a 64 y.o. female with the below pertinent PMH who presents for follow-up of CHF.    Ms. Covarrubias most recently followed up in the CHF clinic 3/20/2024 at which time she did report some worsening of her shortness of breath and was having sinus pressure.  She appeared euvolemic.  She was noted to be slightly hypoxic.  A CXR and PFTs were ordered, and an outpatient pulmonology referral was placed.    Today, Ms. Covarrubias states that she has continued to have mild dyspnea on exertion that has been also stable although recently worsened in the setting of a sinus infection.  She denies any chest pain, palpitations, lightheadedness, orthopnea, or peripheral edema.  She mentions that she did not take her spironolactone or chlorthalidone today to avoid urinating while traveling for appointment.  She has not been checking her BP at home.    ROS: Pertinent findings are included above.      Cardiac Studies  Echo 2/11/2022: LVEF 56-60% with severe concentric hypertrophy and reportedly moderate LV dilation, G2DD, normal RV size/function, mild LA dilation, mild MR.  LHC 2/12/2023: No significant epicardial CAD although all coronaries were very torturous consistent with a longstanding history of hypertensive disease.  Elevated LVEDP (31).  LVEF 60% with no significant gradient across the AV on pullback.  Echo 4/2023: reportedly demonstrated LVH with markedly enlarged LA, enlarged RA, enlarged RV, akinetic septal wall, hypokinetic inferior posterior wall, EF 20-25%, mild to moderate MR and TR.  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  Lexiscan stress SPECT 12/6/2023: Low risk for stress-induced ischemia.  Relatively decreased tracer uptake of the septum that is fixed consistent with possible scar versus artifact, no myocardial perfusion evidence of significant ischemia, LVEF 45% with mild septal hypokinesis.  Cardiac monitor 12/2023: Relatively benign.  Predominantly sinus with average rate 93 () BPM.  No sustained arrhythmias, frequent ectopy, high-grade AV block, or pauses detected.  1 episode of NSVT lasting 5 beats.  1.8% PACs.  Patient triggers correlated to sinus tachycardia with no significant arrhythmia.  Cardiac MRI 2/2/2024: 1.  LA severely dilated, RA normal in size, normal interatrial septum. 2.  LV normal size with mild asymmetric hypertrophy.  Mildly reduced LVEF (42%).  Mildly increased RV size/volume with severely reduced EF (24%). 3.  Delayed enhancement imaging reveals uniformly Nold myocardium (no prior ischemic myocardial damage).  Also no definitive evidence of focal interstitial fibrosis to suggest an infiltrative process. 4.  No significant valvular stenosis or regurgitation or intracardiac shunting. 5.  Main PA is dilated consistent with pulmonary hypertension.  Normal pulmonary venous drainage.     Pertinent PMH  HFrEF  Pulmonary hypertension  Hypertension  Hyperlipidemia  Aortic root dilation (4.4 cm on CT chest 12/20/2023)  Pulmonary nodules (3 mm and 4 mm left lower lobe on CT chest 12/20/2023)    Pertinent past medical, surgical, family, and social history were reviewed.      Current Outpatient Medications:     aspirin 81 MG EC tablet, Take 1 tablet by mouth Daily., Disp: , Rfl:     carvedilol (COREG) 12.5 MG tablet, Take 1 tablet by mouth 2 (Two)  "Times a Day., Disp: 60 tablet, Rfl: 11    chlorthalidone (HYGROTON) 25 MG tablet, Take 1 tablet by mouth Daily., Disp: 30 tablet, Rfl: 11    empagliflozin (Jardiance) 25 MG tablet tablet, Take 0.5 tablets by mouth Daily., Disp: 90 tablet, Rfl: 3    ipratropium (ATROVENT HFA) 17 MCG/ACT inhaler, Inhale 2 puffs 4 (Four) Times a Day., Disp: 12.9 g, Rfl: 1    multivitamin with minerals tablet tablet, Take 1 tablet by mouth Daily., Disp: , Rfl:     sacubitril-valsartan (Entresto)  MG tablet, Take 1 tablet by mouth 2 (Two) Times a Day., Disp: 60 tablet, Rfl: 11    spironolactone (ALDACTONE) 50 MG tablet, Take 1 tablet by mouth Daily., Disp: 30 tablet, Rfl: 11     Objective   Vital Signs:  /87 (BP Location: Right arm, Patient Position: Sitting)   Pulse 93   Wt 85.7 kg (189 lb)   SpO2 90%   BMI 30.51 kg/m²   Estimated body mass index is 30.51 kg/m² as calculated from the following:    Height as of 12/6/23: 167.6 cm (66\").    Weight as of this encounter: 85.7 kg (189 lb).      Constitutional:       Appearance: Healthy appearance. Not in distress.   Neck:      Vascular: JVD normal.   Pulmonary:      Effort: Pulmonary effort is normal.      Breath sounds: Normal breath sounds.   Cardiovascular:      Normal rate. Regular rhythm.      Murmurs: There is no murmur.      No gallop.  No click. No rub.   Edema:     Peripheral edema absent.   Abdominal:      General: There is no distension.      Palpations: Abdomen is soft.      Tenderness: There is no abdominal tenderness.   Skin:     General: Skin is warm and dry.   Neurological:      Mental Status: Alert and oriented to person, place and time.        Result Review :  The following data was reviewed by: Jeremiah Hall MD on 05/16/2024:  BMP   BMP          2/9/2024    10:29 3/20/2024    10:20 5/16/2024    10:21   BMP   BUN 20  15  21    Creatinine 0.96  0.89  0.92    Sodium 138  142  139    Potassium 3.7  3.9  4.0    Chloride 98  103  104    CO2 30.0  31.0  29.0  "   Calcium 9.8  9.3  9.3                Assessment and Plan   Diagnoses and all orders for this visit:    1. Chronic HFrEF (heart failure with reduced ejection fraction) (Primary)  2. Essential hypertension  3. Aortic root dilation  - Increase carvedilol to 25 mg twice daily  - Continue Entresto 97/103 mg twice daily, spironolactone 50 mg daily, Jardiance 12.5 mg daily, chlorthalidone 25 mg daily    4. Lung nodule seen on imaging study  5. Shortness of breath  - Shortness of breath likely related to untreated pulmonary disease.  She continues to have low normal oxygen saturation.  She is scheduled to be evaluated by a pulmonologist at the [facility].    6. Papillary fibroelastoma  - Not well-evaluated on cardiac MRI.  This appeared relatively small and she has no known history of potential embolic phenomenon.  Plan for her to address suspected pulmonary issues and then we may consider EMILY after her follow-up visit.  - Continue aspirin 81 mg daily    Follow Up   Return in about 7 weeks (around 7/4/2024).  Patient was given instructions and counseling regarding her condition or for health maintenance advice. Please see specific information pulled into the AVS if appropriate.     I spent 42 minutes caring for Yuli on this date of service. This time includes time spent by me in the following activities:preparing for the visit, reviewing tests, performing a medically appropriate examination and/or evaluation , counseling and educating the patient/family/caregiver, ordering medications, tests, or procedures, documenting information in the medical record, and care coordination  Time spent on the separately reported service of ReDS interpretation/documentation is not included in the time used to support the E/M service also reported today.      EMR Dragon/Transcription disclaimer: Much of this encounter note is an electronic transcription/translation of spoken language to printed text. The electronic translation of spoken  language may permit erroneous, or at times, nonsensical words or phrases to be inadvertently transcribed; although I have reviewed the note for such errors, some may still exist.

## 2024-05-16 NOTE — PROGRESS NOTES
Heart Failure Clinic    Date: 05/16/24     Vitals:    05/16/24 1029   BP: 140/87   Pulse: 93   SpO2: 90%        Indication:  Heart Failure    Procedure:  ReDS device sensor unit applied to right side of chest and right side of back.  Appropriate positioning confirmed based off of the unit's calculation.  Chest measurement obtained with the chest size ruler.  Measurement session performed over 45 seconds.      Method of arrival: Ambulatory    Weighing self daily: Yes    Taking medications as prescribed: Yes, patient doesn't always take her diuretics    Edema: No edema to lower extremities, patient does report some bloating of her abdomen    Shortness of Air: Yes, with moderate exertion    Number of pillows used at night: <2, patient sleeps on a travel pillow    Results: ReDS Value= 31    Interpretation:  25-35% - normal/ideal lung fluid content    Mallory L Haase, RN 05/16/24 10:30 CDT

## 2024-07-05 RX ORDER — SACUBITRIL AND VALSARTAN 97; 103 MG/1; MG/1
1 TABLET, FILM COATED ORAL 2 TIMES DAILY
Qty: 60 TABLET | Refills: 11 | Status: SHIPPED | OUTPATIENT
Start: 2024-07-05

## 2024-07-10 NOTE — PROGRESS NOTES
"Chief Complaint  Lung Nodule    Subjective    History of Present Illness {CC  Problem List  Visit Diagnosis   Encounters  Notes  Medications  Labs  Result Review Imaging  Media     Yuli Covarrubias presents to [facility] for:    History of Present Illness  Ms. Covarrubias is here as a new patient to establish care.  She has had some shortness of breath ongoing for several years now.  She associates this with occasional wheezing.  She tells me she has a lot of sinus congestion and drainage.  When she had her gallbladder out a few years ago they would let her leave right away because of low oxygen saturations.  She was on the hospital in [place] about a year and a half ago with an elevated BNP.  She tells me they wanted to discharge her on oxygen at that time but she refused.  She takes Xyzal every night.  She has to sleep on her left side due to breathing issues.  She does have a history of CHF and follows up at the heart failure clinic.  PFT is pending.  She has Atrovent but has not used it.  She had a CTA of her chest in 2023 showing 2 tiny sub-6 mm lung nodules.  She was in the [employer] for approximately 14 years and was exposed to excess Beste's and other hazardous chemicals known as a :green death\" and \"blue death\".  She does not smoke.  She does have secondhand smoke exposure.  Her dad  of fatty liver.         Prior to Admission medications    Medication Sig Start Date End Date Taking? Authorizing Provider   aspirin 81 MG EC tablet Take 1 tablet by mouth Daily. 23   Provider, MD Sabas   carvedilol (COREG) 25 MG tablet Take 1 tablet by mouth 2 (Two) Times a Day. 24   Jeremiah Hall MD   chlorthalidone (HYGROTON) 25 MG tablet Take 1 tablet by mouth Daily. 23   Jeremiah Hall MD   empagliflozin (Jardiance) 25 MG tablet tablet Take 0.5 tablets by mouth Daily. 3/20/24   Jcarlos Stephen APRN   multivitamin with " "minerals tablet tablet Take 1 tablet by mouth Daily.    Provider, MD Sabas   sacubitril-valsartan (Entresto)  MG tablet Take 1 tablet by mouth 2 (Two) Times a Day. 7/5/24   Jeremiah Hall MD   spironolactone (ALDACTONE) 50 MG tablet Take 1 tablet by mouth Daily. 7/5/23   Jeremiah Hall MD       Social History     Socioeconomic History    Marital status:    Tobacco Use    Smoking status: Never    Smokeless tobacco: Never   Vaping Use    Vaping status: Never Used   Substance and Sexual Activity    Alcohol use: Never    Drug use: Never       Objective   Vital Signs:   /78   Pulse 94   Ht 167.6 cm (66\")   Wt 86.9 kg (191 lb 9.6 oz)   SpO2 90%   BMI 30.93 kg/m²     Physical Exam  Constitutional:       General: She is not in acute distress.     Interventions: Face mask in place.   HENT:      Head: Normocephalic.      Nose: Nose normal.      Mouth/Throat:      Mouth: Mucous membranes are moist.   Eyes:      General: No scleral icterus.  Cardiovascular:      Rate and Rhythm: Normal rate.   Pulmonary:      Effort: No respiratory distress.      Breath sounds: Decreased breath sounds and wheezing present.   Abdominal:      General: There is no distension.   Neurological:      Mental Status: She is alert and oriented to person, place, and time.   Psychiatric:         Mood and Affect: Mood normal.         Behavior: Behavior is cooperative.        Result Review :{ Labs  Result Review  Imaging  Med Tab  Media :          No results found for this or any previous visit.    Rest/Exercise Pulse Ox Values          7/19/2024    13:00   Rest/Exercise Pulse Ox Results   Rest room air SAT % 92   Exercise room air SAT % 85            CT Angiogram Chest (12/20/2023 09:52)     My interpretation of imaging: Sub-6 mm lung nodules.  Dilated pulmonary artery      Assessment and Plan {CC Problem List  Visit Diagnosis  ROS  Review (Popup)  Health Maintenance  Quality  BestPractice  Medications  SmartSets "  SnapShot Encounters  Media      Diagnoses and all orders for this visit:    1. LAZAR (dyspnea on exertion) (Primary)  Comments:  Schedule for full PFT.  Atrovent as needed.  Trial of Breztri.  Education provided.  Orders:  -     CT Chest Without Contrast; Future  -     Walking Oximetry  -     Alpha - 1 - Antitrypsin; Future    2. PND (post-nasal drip)  Comments:  Rx azelastine.  Orders:  -     Alpha - 1 - Antitrypsin; Future    3. Wheezing  Comments:  Prednisone 20 mg by mouth daily x 5 days.  Orders:  -     Alpha - 1 - Antitrypsin; Future    4. Lung nodules  Comments:  Follow-up CT in 1 year.  Orders:  -     Alpha - 1 - Antitrypsin; Future          Plan as above.  Walking oximetry did confirm need for supplemental oxygen today.  Patient declines oxygen at this time.  Risks of hypoxia discussed and she voices understanding.  Office follow-up in a few weeks.  Call sooner if needed.    Fany Rutherford, APRN  7/19/2024  15:05 CDT    Follow Up {Instructions Charge Capture  Follow-up Communications   Return in about 4 weeks (around 8/16/2024).    Patient was given instructions and counseling regarding her condition or for health maintenance advice. Please see specific information pulled into the AVS if appropriate.

## 2024-07-19 ENCOUNTER — OFFICE VISIT (OUTPATIENT)
Dept: PULMONOLOGY | Facility: CLINIC | Age: 64
End: 2024-07-19
Payer: OTHER GOVERNMENT

## 2024-07-19 VITALS
HEART RATE: 94 BPM | OXYGEN SATURATION: 90 % | DIASTOLIC BLOOD PRESSURE: 78 MMHG | HEIGHT: 66 IN | SYSTOLIC BLOOD PRESSURE: 146 MMHG | WEIGHT: 191.6 LBS | BODY MASS INDEX: 30.79 KG/M2

## 2024-07-19 DIAGNOSIS — R06.09 DOE (DYSPNEA ON EXERTION): Primary | Chronic | ICD-10-CM

## 2024-07-19 DIAGNOSIS — R06.2 WHEEZING: ICD-10-CM

## 2024-07-19 DIAGNOSIS — R91.8 LUNG NODULES: Chronic | ICD-10-CM

## 2024-07-19 DIAGNOSIS — R09.82 PND (POST-NASAL DRIP): Chronic | ICD-10-CM

## 2024-07-19 RX ORDER — PREDNISONE 20 MG/1
20 TABLET ORAL DAILY
Qty: 5 TABLET | Refills: 0 | Status: SHIPPED | OUTPATIENT
Start: 2024-07-19 | End: 2024-07-24

## 2024-07-19 RX ORDER — AZELASTINE HYDROCHLORIDE 137 UG/1
1 SPRAY, METERED NASAL 2 TIMES DAILY
Qty: 30 ML | Refills: 0 | Status: SHIPPED | OUTPATIENT
Start: 2024-07-19 | End: 2024-08-18

## 2024-07-19 NOTE — PROCEDURES
Walking Oximetry    Performed by: Romi Mcdaniel MA  Authorized by: Fany Rutherford APRN    Rest room air SAT %:  92  Exercise room air SAT %:  85

## 2024-07-22 ENCOUNTER — TELEPHONE (OUTPATIENT)
Dept: PULMONOLOGY | Facility: CLINIC | Age: 64
End: 2024-07-22
Payer: OTHER GOVERNMENT

## 2024-07-22 NOTE — TELEPHONE ENCOUNTER
----- Message from [facility] sent at 7/20/2024 11:39 AM CDT -----  Regarding: No, Thank you   Contact: [phone]  Thank you.    like keturah Hall and Jcarlos, dr aguilar from [facility], dr kleber ruiz- absolutely cam tell you also are the epitome of kindness, polina, acceptance and love- as well as you “skills”and intelligence.  Thanks for taking care of me.     You are a most excellent listener, getting your story near 100percent correct- ( I only did 13 years   [facility] ,cause 1.my late spouse did 25 years [employer] also- and I let his career take precedence- he was very skilled and successful at it, &someone had to stay with the daughters, with him away a lot   And 2used my gi bill to go to rn school- [facility] does not have nurses.- Only PA and corpsmen. They mostly “parasite off other services for med/dental)    I only hope that I showed similar kindness during my working years! (I’d still “work for [employer]- or [employer]” haha) if I can get up endurance again. 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

## 2024-07-24 ENCOUNTER — TELEPHONE (OUTPATIENT)
Dept: PULMONOLOGY | Facility: CLINIC | Age: 64
End: 2024-07-24
Payer: OTHER GOVERNMENT

## 2024-07-24 ENCOUNTER — PATIENT ROUNDING (BHMG ONLY) (OUTPATIENT)
Dept: PULMONOLOGY | Facility: CLINIC | Age: 64
End: 2024-07-24
Payer: OTHER GOVERNMENT

## 2024-07-24 DIAGNOSIS — R06.09 DOE (DYSPNEA ON EXERTION): Primary | ICD-10-CM

## 2024-07-24 DIAGNOSIS — R09.02 EXERCISE HYPOXEMIA: ICD-10-CM

## 2024-07-24 NOTE — TELEPHONE ENCOUNTER
----- Message from [facility] sent at 7/24/2024 11:54 AM CDT -----  Regarding: Pft  Contact: [phone]  Was that pft recently ordered? Because I may have “confused the situation?” When I declined going to [facility] for pulmo awhile ago.     Also. Have had a change of heart.   think I would like the home oxygen concentrator- [facility].   I may not need it immediately but if I get a bad cold - has been a breath struggle in the past (considering urgent care- but any measure that may keep me at home is best.  thanks

## 2024-07-24 NOTE — PROGRESS NOTES
July 24, 2024    Hello, may I speak with Yuli Covarrubias?    My name is Rebecca Owusu    I am  with [facility]  [address]  [facility] [address] [phone].    Before we get started may I verify your date of birth? 1960    I am calling to officially welcome you to our practice and ask about your recent visit. Is this a good time to talk? yes    Tell me about your visit with us. What things went well?  Waiting area had snacks for patients.  Visit went very well.       We're always looking for ways to make our patients' experiences even better. Do you have recommendations on ways we may improve?  no    Overall were you satisfied with your first visit to our practice? yes       I appreciate you taking the time to speak with me today. Is there anything else I can do for you? no      Thank you, and have a great day.

## 2024-07-29 ENCOUNTER — TELEPHONE (OUTPATIENT)
Dept: PULMONOLOGY | Facility: CLINIC | Age: 64
End: 2024-07-29
Payer: OTHER GOVERNMENT

## 2024-07-29 ENCOUNTER — HOSPITAL ENCOUNTER (OUTPATIENT)
Dept: PULMONOLOGY | Facility: HOSPITAL | Age: 64
Discharge: HOME | End: 2024-07-29
Payer: OTHER GOVERNMENT

## 2024-07-29 ENCOUNTER — HOSPITAL ENCOUNTER (OUTPATIENT)
Dept: GENERAL RADIOLOGY | Facility: HOSPITAL | Age: 64
Discharge: HOME | End: 2024-07-29
Payer: OTHER GOVERNMENT

## 2024-07-29 DIAGNOSIS — I50.22 CHRONIC HFREF (HEART FAILURE WITH REDUCED EJECTION FRACTION): ICD-10-CM

## 2024-07-29 DIAGNOSIS — J96.11 CHRONIC RESPIRATORY FAILURE WITH HYPOXIA: ICD-10-CM

## 2024-07-29 DIAGNOSIS — R06.09 DOE (DYSPNEA ON EXERTION): ICD-10-CM

## 2024-07-29 DIAGNOSIS — R91.1 LUNG NODULE SEEN ON IMAGING STUDY: ICD-10-CM

## 2024-07-29 DIAGNOSIS — J96.21 ACUTE ON CHRONIC RESPIRATORY FAILURE WITH HYPOXIA: Primary | ICD-10-CM

## 2024-07-29 DIAGNOSIS — R06.02 SHORTNESS OF BREATH: ICD-10-CM

## 2024-07-29 LAB
ARTERIAL PATENCY WRIST A: POSITIVE
ATMOSPHERIC PRESS: 753 MMHG
BASE EXCESS BLDA CALC-SCNC: 3.9 MMOL/L (ref 0–2)
BDY SITE: ABNORMAL
BODY TEMPERATURE: 37
HCO3 BLDA-SCNC: 28.4 MMOL/L (ref 20–26)
Lab: ABNORMAL
MODALITY: ABNORMAL
NOTIFIED BY: ABNORMAL
PCO2 BLDA: 41.4 MM HG (ref 35–45)
PCO2 TEMP ADJ BLD: 41.4 MM HG (ref 35–45)
PH BLDA: 7.45 PH UNITS (ref 7.35–7.45)
PH, TEMP CORRECTED: 7.45 PH UNITS (ref 7.35–7.45)
PO2 BLDA: 48.9 MM HG (ref 83–108)
PO2 TEMP ADJ BLD: 48.9 MM HG (ref 83–108)
SAO2 % BLDCOA: 86.4 % (ref 94–99)
VENTILATOR MODE: ABNORMAL

## 2024-07-29 PROCEDURE — 94726 PLETHYSMOGRAPHY LUNG VOLUMES: CPT | Performed by: INTERNAL MEDICINE

## 2024-07-29 PROCEDURE — 71046 X-RAY EXAM CHEST 2 VIEWS: CPT

## 2024-07-29 PROCEDURE — 94060 EVALUATION OF WHEEZING: CPT | Performed by: INTERNAL MEDICINE

## 2024-07-29 PROCEDURE — 94729 DIFFUSING CAPACITY: CPT | Performed by: INTERNAL MEDICINE

## 2024-07-29 PROCEDURE — 82803 BLOOD GASES ANY COMBINATION: CPT

## 2024-07-29 PROCEDURE — 36600 WITHDRAWAL OF ARTERIAL BLOOD: CPT

## 2024-07-29 PROCEDURE — 94060 EVALUATION OF WHEEZING: CPT

## 2024-07-29 PROCEDURE — 94726 PLETHYSMOGRAPHY LUNG VOLUMES: CPT

## 2024-07-29 PROCEDURE — 94729 DIFFUSING CAPACITY: CPT

## 2024-07-29 RX ORDER — ALBUTEROL SULFATE 2.5 MG/3ML
2.5 SOLUTION RESPIRATORY (INHALATION) ONCE
Status: COMPLETED | OUTPATIENT
Start: 2024-07-29 | End: 2024-07-29

## 2024-07-29 RX ADMIN — ALBUTEROL SULFATE 2.5 MG: 2.5 SOLUTION RESPIRATORY (INHALATION) at 10:13

## 2024-07-29 NOTE — TELEPHONE ENCOUNTER
I was notified by RT of a critical pCO2 on Ms. Covarrubias while she was there to obtain her PFT.  She was not wearing her oxygen.  Oxygen education provided.  Chest x-ray ordered.  Will follow-up chest x-ray by phone.  Keep scheduled follow-up in a couple of weeks.  Tim OSHEA has relayed this information to patient.

## 2024-07-29 NOTE — TELEPHONE ENCOUNTER
----- Message from Fany Rutherford sent at 7/29/2024 11:57 AM CDT -----  Let patient know this looked ok. Nothing else to do for now, keep follow up as planned.

## 2024-07-31 NOTE — TELEPHONE ENCOUNTER
Left message for patient and stated I sent a My chart message to her and if that was sufficient to reply back to it to let us know she received it.

## 2024-08-01 DIAGNOSIS — R09.82 PND (POST-NASAL DRIP): Chronic | ICD-10-CM

## 2024-08-01 DIAGNOSIS — R91.8 LUNG NODULES: Chronic | ICD-10-CM

## 2024-08-01 DIAGNOSIS — R06.09 DOE (DYSPNEA ON EXERTION): Chronic | ICD-10-CM

## 2024-08-01 DIAGNOSIS — R06.2 WHEEZING: ICD-10-CM

## 2024-08-02 ENCOUNTER — TELEPHONE (OUTPATIENT)
Dept: PULMONOLOGY | Facility: CLINIC | Age: 64
End: 2024-08-02
Payer: OTHER GOVERNMENT

## 2024-08-02 NOTE — TELEPHONE ENCOUNTER
"Relayed chest xray results to the patient. She states \" I look at my results on [facility] [employer], make a note to not call me in the future with results\".  "

## 2024-08-06 ENCOUNTER — PATIENT MESSAGE (OUTPATIENT)
Dept: PULMONOLOGY | Facility: CLINIC | Age: 64
End: 2024-08-06
Payer: OTHER GOVERNMENT

## 2024-08-06 NOTE — TELEPHONE ENCOUNTER
From: Yuli Covarrubias  To: Fnay Rutherford  Sent: 8/6/2024 1:26 PM CDT  Subject: Just fyi    Keeping log of it. For awhile after you gave me astepro i stopped using xyzal - worked so well. But past three days back on zyzal too- upper (bronchs ?and nose) audible wheezy again Nothing need done just taking note of it. Outdoor (or indoor?) Allergies may be higher right now

## 2024-08-07 NOTE — PROGRESS NOTES
Chief Complaint  LAZAR (dyspnea on exertion)    Subjective    History of Present Illness {CC  Problem List  Visit Diagnosis   Encounters  Notes  Medications  Labs  Result Review Imaging  Media     Yuli Covarrubias presents to [facility] for:    History of Present Illness  Ms. Covarrubias is here for follow-up and management of asthma.  PFT did show severe obstruction and significant improvement postbronchodilator.  She ended up not using Breztri after last visit.  She did well on azelastine nasal spray for a little bit and actually stopped the Xyzal but has since had to start back on Xyzal.  Allergy symptoms persist.  She tells me she is allergic to all kinds of things.  She is not interested in allergy workup currently.  She has chest congestion and a mostly nonproductive cough.  Walking oximetry did confirm need for supplemental oxygen in last office visit.  She initially refused and then was agreeable a short time later.  She uses supplemental oxygen occasionally.  ABG on room air during PFT did show hypoxia.  History of CHF.  Chest x-ray unremarkable.       Prior to Admission medications    Medication Sig Start Date End Date Taking? Authorizing Provider   aspirin 81 MG EC tablet Take 1 tablet by mouth Daily.  Patient not taking: Reported on 7/19/2024 12/11/23   ProviderSabas MD   Azelastine HCl 137 MCG/SPRAY solution 1 spray into the nostril(s) as directed by provider 2 (Two) Times a Day for 30 days. 7/19/24 8/18/24  Fany Rutherford APRN   carvedilol (COREG) 25 MG tablet Take 1 tablet by mouth 2 (Two) Times a Day. 5/16/24   Jeremiah Hall MD   chlorthalidone (HYGROTON) 25 MG tablet Take 1 tablet by mouth Daily. 7/5/23   Jeremiah Hall MD   empagliflozin (Jardiance) 25 MG tablet tablet Take 0.5 tablets by mouth Daily. 3/20/24   Jcarlos Stephen APRN   multivitamin with minerals tablet tablet Take 1 tablet by mouth Daily.    ProviderSabas,  "MD   sacubitril-valsartan (Entresto)  MG tablet Take 1 tablet by mouth 2 (Two) Times a Day. 7/5/24   Jeremiah Hall MD   spironolactone (ALDACTONE) 50 MG tablet Take 1 tablet by mouth Daily. 7/5/23   Jeremiah Hall MD       Social History     Socioeconomic History    Marital status:    Tobacco Use    Smoking status: Never    Smokeless tobacco: Never   Vaping Use    Vaping status: Never Used   Substance and Sexual Activity    Alcohol use: Never    Drug use: Never       Objective   Vital Signs:   /72   Pulse 88   Ht 167.6 cm (66\")   Wt 89.4 kg (197 lb)   SpO2 (!) 89% Comment: RA  BMI 31.80 kg/m²     Physical Exam  Constitutional:       General: She is not in acute distress.  HENT:      Head: Normocephalic.      Nose: Nose normal.      Mouth/Throat:      Mouth: Mucous membranes are moist.   Eyes:      General: No scleral icterus.  Cardiovascular:      Rate and Rhythm: Normal rate.   Pulmonary:      Effort: No respiratory distress.   Abdominal:      General: There is no distension.   Neurological:      Mental Status: She is alert and oriented to person, place, and time.   Psychiatric:         Mood and Affect: Mood normal.         Behavior: Behavior is cooperative.        Result Review :{ Labs  Result Review  Imaging  Med Tab  Media :          Results for orders placed during the hospital encounter of 07/29/24    Complete PFT - Pre & Post Bronchodilator    Narrative  [facility] - Pulmonary Function Test    [address] [place] [address]  [id number]  [phone]    Patient : Yuli Covarrubias  MRN : [id number]  CSN : [id number]  Pulmonologist : Myron Guzman MD  Date : 7/29/2024    ______________________________________________________________________    Interpretation :  1.  Spirometry is consistent with a severe obstructive ventilatory defect.  2.  There is significant improvement in spirometry postbronchodilator.  Postbronchodilator spirometry is consistent with a moderate " obstructive ventilatory defect.  3.  Lung volumes reveal hyperinflation along with a decrease in vital capacity secondary to obstruction.  There is also a decrease in inspiratory capacity.  4.  There is a mild bordering on moderate diffusion impairment which when corrected for alveolar volume is normalized.  5.  Arterial blood gases on room air reveal moderate bordering on severe hypoxemia.      Myron Guzman MD    Rest/Exercise Pulse Ox Values          7/19/2024    13:00   Rest/Exercise Pulse Ox Results   Rest room air SAT % 92   Exercise room air SAT % 85              XR Chest 2 View (07/29/2024 10:59) -negative      Alpha-1 M/M      Assessment and Plan {CC Problem List  Visit Diagnosis  ROS  Review (Popup)  Health Maintenance  Quality  BestPractice  Medications  SmartSets  SnapShot Encounters  Media      Diagnoses and all orders for this visit:    1. Moderate persistent asthma without complication (Primary)  Comments:  Trial of Trelegy.  Education provided.  We demonstrated proper use of Ellipta device.  Continue ipratropium as needed.  We reviewed PFT.    2. Chronic respiratory failure with hypoxia  Comments:  Continue supplemental oxygen with exertion.  We reviewed recent ABG.    3. Lung nodules  Comments:  CT scan due December 2024.  Orders have already been placed.          Plan as above.  If she decides on an allergy workup she will call and let us know.  Office follow-up as scheduled in 5 months.  Call sooner if needed.    Fany Rutherford, APRN  8/16/2024  13:43 CDT    Follow Up {Instructions Charge Capture  Follow-up Communications   Return for Next scheduled follow up.    Patient was given instructions and counseling regarding her condition or for health maintenance advice. Please see specific information pulled into the AVS if appropriate.

## 2024-08-16 ENCOUNTER — OFFICE VISIT (OUTPATIENT)
Dept: PULMONOLOGY | Facility: CLINIC | Age: 64
End: 2024-08-16
Payer: OTHER GOVERNMENT

## 2024-08-16 VITALS
SYSTOLIC BLOOD PRESSURE: 126 MMHG | BODY MASS INDEX: 31.66 KG/M2 | WEIGHT: 197 LBS | OXYGEN SATURATION: 89 % | DIASTOLIC BLOOD PRESSURE: 72 MMHG | HEIGHT: 66 IN | HEART RATE: 88 BPM

## 2024-08-16 DIAGNOSIS — R91.8 LUNG NODULES: Chronic | ICD-10-CM

## 2024-08-16 DIAGNOSIS — J96.11 CHRONIC RESPIRATORY FAILURE WITH HYPOXIA: Chronic | ICD-10-CM

## 2024-08-16 DIAGNOSIS — J45.40 MODERATE PERSISTENT ASTHMA WITHOUT COMPLICATION: Primary | Chronic | ICD-10-CM

## 2024-08-16 PROCEDURE — 99214 OFFICE O/P EST MOD 30 MIN: CPT | Performed by: NURSE PRACTITIONER

## 2024-08-19 ENCOUNTER — TELEPHONE (OUTPATIENT)
Dept: PULMONOLOGY | Facility: CLINIC | Age: 64
End: 2024-08-19
Payer: OTHER GOVERNMENT

## 2024-08-19 NOTE — TELEPHONE ENCOUNTER
Airways were clear on CT chest from . We will also be repeating that ct in December so we can re-evaluate the airways at that time again.     If difficulty breathing through her nose persists we could consider a ct of her sinuses and consider a swallow study if she continues to notice difficulty swallowing.

## 2024-08-19 NOTE — TELEPHONE ENCOUNTER
----- Message from [facility] sent at 8/18/2024  3:23 PM CDT -----  Regarding: Stuff I decided to tell ya   Contact: [phone]  Wasn’t saying too much about stuff because wanted you, as the expert, to do your tests and come to your conclusions/diagnosis.   But decided to ask about the possibility? Of maybe an #actual obstruction?# Like between the right nostril and the right upper bronchs- where I hear the rattle or whistle noise, at night   -and cannot sleep on my right side - like maybe I breathed in something?- that maybe needs removed? by a small or microscopic even? wire? (That might be far fetched.  But why I asked them to show upper chest too in the cX-ray, if anything could be seen? inside there?) it’s inconvenient to constantly sleep on my left side, because that’s my “greater trochanter bursitis hip” so discomfort, to sleep on it all the time. But was afraid to mention, because figured looking for an obstruction might mean a hospital stay over, for any procedures?  It’s just that it’s always same place- same side.  Never upper left side.   Why I thought might be more than allergy/mucus? Something I inhaled got stuck? I’m no pulmo - so that might be far fetched imagination.  It’s gone on a very long time  &also doesn’t seem to be anything I can cough or blow out.  Annoying.   Only other symptom I have sometime is difficulty swallowing - which I think is an older person typical symptom. Stuff stuck Down low

## 2024-08-19 NOTE — TELEPHONE ENCOUNTER
----- Message from [facility] sent at 8/16/2024  8:01 PM CDT -----  Regarding: Thank you  Contact: [phone]  Thank you again for taking care of me, and for the inhaler as well as a diagnosis with simplified explanation of the blood gas (which I’ve always found to be like algebra!)Sorry my silliness today! but hearing “asthma” was a bit of a relief (over copd)  And I’m attaching info on my cousins party, in case you get out on a Saturday.  But like I said I probably won’t go @ashamed of my “increased girth”(although cousin was kind enough, he took time out to come to couple of my birthday parties)

## 2024-09-12 ENCOUNTER — HOSPITAL ENCOUNTER (OUTPATIENT)
Dept: CARDIOLOGY | Facility: HOSPITAL | Age: 64
Discharge: HOME | End: 2024-09-12
Payer: OTHER GOVERNMENT

## 2024-09-12 VITALS
RESPIRATION RATE: 14 BRPM | OXYGEN SATURATION: 90 % | HEART RATE: 107 BPM | BODY MASS INDEX: 31.31 KG/M2 | WEIGHT: 194 LBS | SYSTOLIC BLOOD PRESSURE: 131 MMHG | DIASTOLIC BLOOD PRESSURE: 93 MMHG

## 2024-09-12 DIAGNOSIS — I50.22 CHRONIC HFREF (HEART FAILURE WITH REDUCED EJECTION FRACTION): Primary | ICD-10-CM

## 2024-09-12 DIAGNOSIS — I10 ESSENTIAL HYPERTENSION: ICD-10-CM

## 2024-09-12 DIAGNOSIS — D15.1 PAPILLARY FIBROELASTOMA: ICD-10-CM

## 2024-09-12 DIAGNOSIS — I77.810 AORTIC ROOT DILATION: ICD-10-CM

## 2024-09-12 LAB
ABSOLUTE LUNG FLUID CONTENT: 26 % (ref 20–35)
ALBUMIN SERPL-MCNC: 3.7 G/DL (ref 3.5–5.2)
ALBUMIN/GLOB SERPL: 1.4 G/DL
ALP SERPL-CCNC: 64 U/L (ref 39–117)
ALT SERPL W P-5'-P-CCNC: 11 U/L (ref 1–33)
ANION GAP SERPL CALCULATED.3IONS-SCNC: 8 MMOL/L (ref 5–15)
AST SERPL-CCNC: 17 U/L (ref 1–32)
BILIRUB SERPL-MCNC: 0.4 MG/DL (ref 0–1.2)
BUN SERPL-MCNC: 20 MG/DL (ref 8–23)
BUN/CREAT SERPL: 20.2 (ref 7–25)
CALCIUM SPEC-SCNC: 8.8 MG/DL (ref 8.6–10.5)
CHLORIDE SERPL-SCNC: 102 MMOL/L (ref 98–107)
CO2 SERPL-SCNC: 31 MMOL/L (ref 22–29)
CREAT SERPL-MCNC: 0.99 MG/DL (ref 0.57–1)
EGFRCR SERPLBLD CKD-EPI 2021: 63.4 ML/MIN/1.73
GLOBULIN UR ELPH-MCNC: 2.7 GM/DL
GLUCOSE SERPL-MCNC: 104 MG/DL (ref 65–99)
NT-PROBNP SERPL-MCNC: 307.3 PG/ML (ref 0–900)
POTASSIUM SERPL-SCNC: 3.7 MMOL/L (ref 3.5–5.2)
PROT SERPL-MCNC: 6.4 G/DL (ref 6–8.5)
QT INTERVAL: 376 MS
QTC INTERVAL: 490 MS
SODIUM SERPL-SCNC: 141 MMOL/L (ref 136–145)

## 2024-09-12 PROCEDURE — 80053 COMPREHEN METABOLIC PANEL: CPT | Performed by: HOSPITALIST

## 2024-09-12 PROCEDURE — 94726 PLETHYSMOGRAPHY LUNG VOLUMES: CPT | Performed by: HOSPITALIST

## 2024-09-12 PROCEDURE — 83880 ASSAY OF NATRIURETIC PEPTIDE: CPT | Performed by: HOSPITALIST

## 2024-09-12 PROCEDURE — 93005 ELECTROCARDIOGRAM TRACING: CPT | Performed by: HOSPITALIST

## 2024-09-12 RX ORDER — SACUBITRIL AND VALSARTAN 49; 51 MG/1; MG/1
1 TABLET, FILM COATED ORAL 2 TIMES DAILY
Qty: 90 TABLET | Refills: 3 | Status: SHIPPED | OUTPATIENT
Start: 2024-09-12

## 2024-09-12 NOTE — PROGRESS NOTES
Reason For Visit:  CHF    Subjective        Yuli Covarrubias is a 64 y.o. female with the below pertinent PMH who presents for follow-up of CHF.    Yuli Covarrubias was most recently seen by me in clinic on 5/16/2024 at which time she continued to have shortness of breath felt to be related to underlying lung disease; she was pending pulmonology evaluation.  Carvedilol was increased to 25 mg twice daily.    Since that time, the patient reports that she did stop taking her evening dose of carvedilol and Entresto because she felt like her BP was low at those times (she was trying to keep her SBP in the 120s).  She did also feel like she was a little more lightheaded in the evenings.  She has seen pulmonology and started using inhalers, which she feels has been helpful for wheezing in her breathing.  She denies chest pain, palpitations, orthopnea, or peripheral edema.    ROS: Pertinent findings are included above.    Cardiac Studies  Echo 2/11/2022: LVEF 56-60% with severe concentric hypertrophy and reportedly moderate LV dilation, G2DD, normal RV size/function, mild LA dilation, mild MR.  C 2/12/2023: No significant epicardial CAD although all coronaries were very torturous consistent with a longstanding history of hypertensive disease.  Elevated LVEDP (31).  LVEF 60% with no significant gradient across the AV on pullback.  Echo 4/2023: reportedly demonstrated LVH with markedly enlarged LA, enlarged RA, enlarged RV, akinetic septal wall, hypokinetic inferior posterior wall, EF 20-25%, mild to moderate MR and TR.  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  Lexiscan stress SPECT 12/6/2023: Low risk for stress-induced ischemia.  Relatively decreased tracer uptake of the septum that is fixed consistent with possible scar versus artifact, no myocardial perfusion evidence of significant ischemia, LVEF 45% with mild septal hypokinesis.  Cardiac monitor 12/2023: Relatively benign.  Predominantly sinus with average rate 93 () BPM.  No sustained arrhythmias, frequent ectopy, high-grade AV block, or pauses detected.  1 episode of NSVT lasting 5 beats.  1.8% PACs.  Patient triggers correlated to sinus tachycardia with no significant arrhythmia.  Cardiac MRI 2/2/2024: 1.  LA severely dilated, RA normal in size, normal interatrial septum. 2.  LV normal size with mild asymmetric hypertrophy.  Mildly reduced LVEF (42%).  Mildly increased RV size/volume with severely reduced EF (24%). 3.  Delayed enhancement imaging reveals uniformly Nold myocardium (no prior ischemic myocardial damage).  Also no definitive evidence of focal interstitial fibrosis to suggest an infiltrative process. 4.  No significant valvular stenosis or regurgitation or intracardiac shunting. 5.  Main PA is dilated consistent with pulmonary hypertension.  Normal pulmonary venous drainage.     Pertinent PMH  HFrEF  Pulmonary hypertension  Hypertension  Hyperlipidemia  Aortic root dilation (4.4 cm on CT chest 12/20/2023)  Pulmonary nodules (3 mm and 4 mm left lower lobe on CT chest 12/20/2023)    Pertinent past medical, surgical, family, and social history were reviewed.      Current Outpatient Medications:     aspirin 81 MG EC tablet, Take 1 tablet by mouth Daily., Disp: , Rfl:     carvedilol (COREG) 25 MG tablet, Take 1 tablet by mouth 2 (Two) Times a Day., Disp: 180  "tablet, Rfl: 3    chlorthalidone (HYGROTON) 25 MG tablet, Take 1 tablet by mouth Daily., Disp: 30 tablet, Rfl: 11    empagliflozin (Jardiance) 25 MG tablet tablet, Take 0.5 tablets by mouth Daily., Disp: 90 tablet, Rfl: 3    spironolactone (ALDACTONE) 50 MG tablet, Take 1 tablet by mouth Daily., Disp: 30 tablet, Rfl: 11    sacubitril-valsartan (Entresto) 49-51 MG tablet, Take 1 tablet by mouth 2 (Two) Times a Day., Disp: 90 tablet, Rfl: 3     Objective   Vital Signs:  /93 (BP Location: Left arm)   Pulse 107   Resp 14   Wt 88 kg (194 lb)   SpO2 90%   BMI 31.31 kg/m²   Estimated body mass index is 31.31 kg/m² as calculated from the following:    Height as of 8/16/24: 167.6 cm (66\").    Weight as of this encounter: 88 kg (194 lb).      Constitutional:       Appearance: Healthy appearance. Not in distress.   Neck:      Vascular: JVD normal.   Pulmonary:      Effort: Pulmonary effort is normal.      Breath sounds: Normal breath sounds.   Cardiovascular:      Normal rate. Regular rhythm.      Murmurs: There is a grade 2/6 systolic murmur at the LLSB.      No gallop.  No click. No rub.   Edema:     Peripheral edema absent.   Abdominal:      General: There is no distension.      Palpations: Abdomen is soft.      Tenderness: There is no abdominal tenderness.   Skin:     General: Skin is warm and dry.   Neurological:      Mental Status: Alert and oriented to person, place and time.      \  Result Review :  The following data was reviewed by: Jeremiah Hall MD on 09/12/2024:  BMP   BMP          3/20/2024    10:20 5/16/2024    10:21 9/12/2024    10:42   BMP   BUN 15  21  20    Creatinine 0.89  0.92  0.99    Sodium 142  139  141    Potassium 3.9  4.0  3.7    Chloride 103  104  102    CO2 31.0  29.0  31.0    Calcium 9.3  9.3  8.8      Pro-BNP       Lab 09/12/24  1042   PROBNP 307.3        Assessment and Plan   Diagnoses and all orders for this visit:    1. Chronic HFrEF (heart failure with reduced ejection fraction) " (Primary)  2. Essential hypertension  3. Aortic root dilation  -BP and HR are both high in clinic today.  We did discuss BP goals in the context of her CHF and aortic root dilation.  I think she would benefit from BID dosing of her Entresto and carvedilol, and she was encouraged to do this.  Given her prior concerns about lower BP I will reduce the dose of Entresto some with transitioning to BID.  - Plan for Entresto 49/51 mg twice daily  - Increase carvedilol 25 mg back to recommended 25 mg twice daily  - Continue spironolactone 50 mg daily  - Continue chlorthalidone 25 mg daily    4. Papillary fibroelastoma  - She remains on aspirin 81 mg daily  - We did discuss the potential stroke risks with PFE and possibility of a EMILY for the potential consideration of surgery if she would like to pursue this.  For now, she ultimately favors monitoring and not pursuing EMILY at the time being.    Follow Up   Return in about 2 months (around 11/12/2024).  Patient was given instructions and counseling regarding her condition or for health maintenance advice. Please see specific information pulled into the AVS if appropriate.       EMR Dragon/Transcription disclaimer: Much of this encounter note is an electronic transcription/translation of spoken language to printed text. The electronic translation of spoken language may permit erroneous, or at times, nonsensical words or phrases to be inadvertently transcribed; although I have reviewed the note for such errors, some may still exist.

## 2024-09-12 NOTE — PROGRESS NOTES
Heart Failure Clinic    Date:  09/12/24     Vitals:    09/12/24 1055   BP: 131/93   Pulse: 107   Resp: 14   SpO2: 90%        Indication:  Heart Failure     Procedure:  ReDS device sensor unit applied to right side of chest and right side of back.  Appropriate positioning confirmed based off of the unit's calculation.  Chest measurement obtained with the chest size ruler.  Measurement session performed over 45 seconds.      Method of arrival:  Ambulatory    Weighing self daily:  Yes    Taking medications as prescribed:  Yes    Edema:  No    Shortness of Air:  Yes    Number of pillows used at night:  <2    Results:  ReDS Value=          26                Interpretation:  25-35% - normal/ideal lung fluid content    Catherine Willis RN 09/12/24 10:57 CDT

## 2024-09-13 RX ORDER — CARVEDILOL 25 MG/1
25 TABLET ORAL 2 TIMES DAILY
Qty: 180 TABLET | Refills: 3 | Status: SHIPPED | OUTPATIENT
Start: 2024-09-13

## 2024-09-13 RX ORDER — CHLORTHALIDONE 25 MG/1
25 TABLET ORAL DAILY
Qty: 30 TABLET | Refills: 11 | Status: SHIPPED | OUTPATIENT
Start: 2024-09-13

## 2024-09-13 RX ORDER — SPIRONOLACTONE 50 MG/1
50 TABLET, FILM COATED ORAL DAILY
Qty: 30 TABLET | Refills: 11 | Status: SHIPPED | OUTPATIENT
Start: 2024-09-13

## 2024-09-13 NOTE — TELEPHONE ENCOUNTER
Please send Coreg to [facility] on [address]. All others need to be printed so [facility].

## 2024-09-16 DIAGNOSIS — J45.40 MODERATE PERSISTENT ASTHMA WITHOUT COMPLICATION: Primary | ICD-10-CM

## 2024-09-17 DIAGNOSIS — J45.40 MODERATE PERSISTENT ASTHMA WITHOUT COMPLICATION: Primary | ICD-10-CM

## 2024-09-17 RX ORDER — FLUTICASONE PROPIONATE AND SALMETEROL 250; 50 UG/1; UG/1
1 POWDER RESPIRATORY (INHALATION) 2 TIMES DAILY
Qty: 1 EACH | Refills: 11 | Status: SHIPPED | OUTPATIENT
Start: 2024-09-17

## 2024-10-01 ENCOUNTER — TRANSCRIBE ORDERS (OUTPATIENT)
Dept: PHYSICAL THERAPY | Facility: HOSPITAL | Age: 64
End: 2024-10-01
Payer: OTHER GOVERNMENT

## 2024-10-01 DIAGNOSIS — M25.559 HIP PAIN, UNSPECIFIED LATERALITY: Primary | ICD-10-CM

## 2024-10-14 ENCOUNTER — APPOINTMENT (OUTPATIENT)
Dept: GENERAL RADIOLOGY | Facility: HOSPITAL | Age: 64
End: 2024-10-14
Payer: OTHER GOVERNMENT

## 2024-10-14 PROCEDURE — 73562 X-RAY EXAM OF KNEE 3: CPT

## 2024-10-15 ENCOUNTER — TELEPHONE (OUTPATIENT)
Age: 64
End: 2024-10-15
Payer: OTHER GOVERNMENT

## (undated) DEVICE — GW STARTER FXD CORE J .035 3X260CM 3MM

## (undated) DEVICE — 4-PORT MANIFOLD: Brand: NEPTUNE 2

## (undated) DEVICE — FR5 INFINITI MULTIPAC: Brand: INFINITI

## (undated) DEVICE — MONOPOLAR METZENBAUM SCISSOR, MINI BLADE TIP, DISPOSABLE: Brand: MONOPOLAR METZENBAUM SCISSOR, MINI BLADE TIP, DISPOSABLE

## (undated) DEVICE — MODEL BT2000 P/N 700287-012KIT CONTENTS: MANIFOLD WITH SALINE AND CONTRAST PORTS, SALINE TUBING WITH SPIKE AND HAND SYRINGE, TRANSDUCER: Brand: BT2000 AUTOMATED MANIFOLD KIT

## (undated) DEVICE — GLV SURG BIOGEL LTX PF 6 1/2

## (undated) DEVICE — 2, DISPOSABLE SUCTION/IRRIGATOR WITHOUT DISPOSABLE TIP: Brand: STRYKEFLOW

## (undated) DEVICE — SOL IRR NACL 0.9PCT BT 1000ML

## (undated) DEVICE — PK TURNOVER RM ADV

## (undated) DEVICE — ANTIBACTERIAL UNDYED BRAIDED (POLYGLACTIN 910), SYNTHETIC ABSORBABLE SUTURE: Brand: COATED VICRYL

## (undated) DEVICE — MODEL AT P65, P/N 701554-001KIT CONTENTS: HAND CONTROLLER, 3-WAY HIGH-PRESSURE STOPCOCK WITH ROTATING END AND PREMIUM HIGH-PRESSURE TUBING: Brand: ANGIOTOUCH® KIT

## (undated) DEVICE — ENDOPATH XCEL WITH OPTIVIEW TECHNOLOGY DILATING TIP TROCARS WITH STABILITY SLEEVES: Brand: ENDOPATH XCEL OPTIVIEW

## (undated) DEVICE — VAGINAL PREP TRAY: Brand: MEDLINE INDUSTRIES, INC.

## (undated) DEVICE — ENDOPATH XCEL WITH OPTIVIEW TECHNOLOGY UNIVERSAL TROCAR STABILITY SLEEVES: Brand: ENDOPATH XCEL OPTIVIEW

## (undated) DEVICE — 3M™ STERI-STRIP™ REINFORCED ADHESIVE SKIN CLOSURES, R1547, 1/2 IN X 4 IN (12 MM X 100 MM), 6 STRIPS/ENVELOPE: Brand: 3M™ STERI-STRIP™

## (undated) DEVICE — ENDOPATH XCEL DILATING TIP TROCARS WITH STABILITY SLEEVES: Brand: ENDOPATH XCEL

## (undated) DEVICE — TOWEL,OR,DSP,ST,BLUE,STD,4/PK,20PK/CS: Brand: MEDLINE

## (undated) DEVICE — ENDOPATH PNEUMONEEDLE INSUFFLATION NEEDLES WITH LUER LOCK CONNECTORS 120MM: Brand: ENDOPATH

## (undated) DEVICE — SOLIDIFIER LIQUI LOC PLUS 2000CC

## (undated) DEVICE — PAD, DEFIB, ADULT, RADIOTRANS, PHILIPS: Brand: MEDLINE

## (undated) DEVICE — RADIFOCUS OPTITORQUE ANGIOGRAPHIC CATHETER: Brand: OPTITORQUE

## (undated) DEVICE — PAD LAP CHOLE: Brand: MEDLINE INDUSTRIES, INC.

## (undated) DEVICE — PENCL ES MEGADINE EZ/CLEAN BUTN W/HOLSTR 10FT

## (undated) DEVICE — SUP ARMBRD ART/LINE BLU

## (undated) DEVICE — ENDOPOUCH RETRIEVER SPECIMEN RETRIEVAL BAGS: Brand: ENDOPOUCH RETRIEVER

## (undated) DEVICE — TRAP FLD MINIVAC MEGADYNE 100ML

## (undated) DEVICE — TIBURON BRACHIAL ANGIOGRAPHY DRAPE: Brand: CONVERTORS

## (undated) DEVICE — CANN NASL ETCO2 LO/FLO A/

## (undated) DEVICE — ELECTRD L HK EZ CLN 33CM

## (undated) DEVICE — GLIDESHEATH SLENDER STAINLESS STEEL KIT: Brand: GLIDESHEATH SLENDER

## (undated) DEVICE — PK CATH CARD 30 CA/4

## (undated) DEVICE — DRSNG SURESITE WNDW 4X4.5

## (undated) DEVICE — KT NDL GUIDE STRL 18GA

## (undated) DEVICE — SUT VIC 0 SUTUPAK TIES 18IN J906G

## (undated) DEVICE — TBG PENCL TELESCP MEGADYNE SMOKE EVAC 10FT

## (undated) DEVICE — TR BAND RADIAL ARTERY COMPRESSION DEVICE: Brand: TR BAND